# Patient Record
Sex: MALE | Race: OTHER | NOT HISPANIC OR LATINO | URBAN - METROPOLITAN AREA
[De-identification: names, ages, dates, MRNs, and addresses within clinical notes are randomized per-mention and may not be internally consistent; named-entity substitution may affect disease eponyms.]

---

## 2019-12-05 ENCOUNTER — INPATIENT (INPATIENT)
Facility: HOSPITAL | Age: 54
LOS: 3 days | Discharge: HOME | DRG: 199 | End: 2019-12-09
Attending: INTERNAL MEDICINE | Admitting: INTERNAL MEDICINE
Payer: MEDICAID

## 2019-12-05 VITALS
TEMPERATURE: 98 F | WEIGHT: 160.06 LBS | HEIGHT: 65 IN | OXYGEN SATURATION: 96 % | DIASTOLIC BLOOD PRESSURE: 97 MMHG | HEART RATE: 116 BPM | SYSTOLIC BLOOD PRESSURE: 217 MMHG | RESPIRATION RATE: 18 BRPM

## 2019-12-05 DIAGNOSIS — I16.1 HYPERTENSIVE EMERGENCY: ICD-10-CM

## 2019-12-05 DIAGNOSIS — R55 SYNCOPE AND COLLAPSE: ICD-10-CM

## 2019-12-05 LAB
BASOPHILS # BLD AUTO: 0.06 K/UL — SIGNIFICANT CHANGE UP (ref 0–0.2)
BASOPHILS NFR BLD AUTO: 0.5 % — SIGNIFICANT CHANGE UP (ref 0–2)
EOSINOPHIL # BLD AUTO: 0.07 K/UL — SIGNIFICANT CHANGE UP (ref 0–0.5)
EOSINOPHIL NFR BLD AUTO: 0.6 % — SIGNIFICANT CHANGE UP (ref 0–6)
HCT VFR BLD CALC: 47.6 % — SIGNIFICANT CHANGE UP (ref 39–50)
HGB BLD-MCNC: 15.9 G/DL — SIGNIFICANT CHANGE UP (ref 13–17)
IMM GRANULOCYTES NFR BLD AUTO: 0.6 % — SIGNIFICANT CHANGE UP (ref 0–1.5)
LYMPHOCYTES # BLD AUTO: 25.9 % — SIGNIFICANT CHANGE UP (ref 13–44)
LYMPHOCYTES # BLD AUTO: 3.13 K/UL — SIGNIFICANT CHANGE UP (ref 1–3.3)
MCHC RBC-ENTMCNC: 30.3 PG — SIGNIFICANT CHANGE UP (ref 27–34)
MCHC RBC-ENTMCNC: 33.4 GM/DL — SIGNIFICANT CHANGE UP (ref 32–36)
MCV RBC AUTO: 90.8 FL — SIGNIFICANT CHANGE UP (ref 80–100)
MONOCYTES # BLD AUTO: 0.82 K/UL — SIGNIFICANT CHANGE UP (ref 0–0.9)
MONOCYTES NFR BLD AUTO: 6.8 % — SIGNIFICANT CHANGE UP (ref 2–14)
NEUTROPHILS # BLD AUTO: 7.94 K/UL — HIGH (ref 1.8–7.4)
NEUTROPHILS NFR BLD AUTO: 65.6 % — SIGNIFICANT CHANGE UP (ref 43–77)
PLATELET # BLD AUTO: 202 K/UL — SIGNIFICANT CHANGE UP (ref 150–400)
RBC # BLD: 5.24 M/UL — SIGNIFICANT CHANGE UP (ref 4.2–5.8)
RBC # FLD: 12.3 % — SIGNIFICANT CHANGE UP (ref 10.3–14.5)
TROPONIN I SERPL-MCNC: 0.05 NG/ML — HIGH (ref 0.01–0.04)
TROPONIN I SERPL-MCNC: 0.08 NG/ML — HIGH (ref 0.01–0.04)
WBC # BLD: 12.09 K/UL — HIGH (ref 3.8–10.5)
WBC # FLD AUTO: 12.09 K/UL — HIGH (ref 3.8–10.5)

## 2019-12-05 PROCEDURE — 95816 EEG AWAKE AND DROWSY: CPT

## 2019-12-05 PROCEDURE — 93005 ELECTROCARDIOGRAM TRACING: CPT

## 2019-12-05 PROCEDURE — 84244 ASSAY OF RENIN: CPT

## 2019-12-05 PROCEDURE — 93010 ELECTROCARDIOGRAM REPORT: CPT

## 2019-12-05 PROCEDURE — 80053 COMPREHEN METABOLIC PANEL: CPT

## 2019-12-05 PROCEDURE — 93306 TTE W/DOPPLER COMPLETE: CPT

## 2019-12-05 PROCEDURE — 36415 COLL VENOUS BLD VENIPUNCTURE: CPT

## 2019-12-05 PROCEDURE — 70551 MRI BRAIN STEM W/O DYE: CPT

## 2019-12-05 PROCEDURE — 82088 ASSAY OF ALDOSTERONE: CPT

## 2019-12-05 PROCEDURE — 80061 LIPID PANEL: CPT

## 2019-12-05 PROCEDURE — A9500: CPT

## 2019-12-05 PROCEDURE — 70450 CT HEAD/BRAIN W/O DYE: CPT | Mod: 26

## 2019-12-05 PROCEDURE — 99222 1ST HOSP IP/OBS MODERATE 55: CPT

## 2019-12-05 PROCEDURE — 85027 COMPLETE CBC AUTOMATED: CPT

## 2019-12-05 PROCEDURE — 71045 X-RAY EXAM CHEST 1 VIEW: CPT | Mod: 26

## 2019-12-05 PROCEDURE — 93017 CV STRESS TEST TRACING ONLY: CPT

## 2019-12-05 PROCEDURE — 78452 HT MUSCLE IMAGE SPECT MULT: CPT

## 2019-12-05 PROCEDURE — 84484 ASSAY OF TROPONIN QUANT: CPT

## 2019-12-05 PROCEDURE — 93975 VASCULAR STUDY: CPT

## 2019-12-05 RX ORDER — ONDANSETRON 8 MG/1
4 TABLET, FILM COATED ORAL EVERY 6 HOURS
Refills: 0 | Status: DISCONTINUED | OUTPATIENT
Start: 2019-12-05 | End: 2019-12-09

## 2019-12-05 RX ORDER — LABETALOL HCL 100 MG
10 TABLET ORAL ONCE
Refills: 0 | Status: COMPLETED | OUTPATIENT
Start: 2019-12-05 | End: 2019-12-05

## 2019-12-05 RX ORDER — POTASSIUM CHLORIDE 20 MEQ
40 PACKET (EA) ORAL ONCE
Refills: 0 | Status: COMPLETED | OUTPATIENT
Start: 2019-12-05 | End: 2019-12-05

## 2019-12-05 RX ORDER — HYDRALAZINE HCL 50 MG
10 TABLET ORAL EVERY 4 HOURS
Refills: 0 | Status: DISCONTINUED | OUTPATIENT
Start: 2019-12-05 | End: 2019-12-09

## 2019-12-05 RX ORDER — LISINOPRIL 2.5 MG/1
10 TABLET ORAL DAILY
Refills: 0 | Status: DISCONTINUED | OUTPATIENT
Start: 2019-12-05 | End: 2019-12-06

## 2019-12-05 RX ORDER — LABETALOL HCL 100 MG
200 TABLET ORAL
Refills: 0 | Status: DISCONTINUED | OUTPATIENT
Start: 2019-12-05 | End: 2019-12-09

## 2019-12-05 RX ADMIN — LISINOPRIL 10 MILLIGRAM(S): 2.5 TABLET ORAL at 14:54

## 2019-12-05 RX ADMIN — Medication 10 MILLIGRAM(S): at 20:05

## 2019-12-05 RX ADMIN — Medication 10 MILLIGRAM(S): at 12:59

## 2019-12-05 RX ADMIN — Medication 40 MILLIEQUIVALENT(S): at 13:35

## 2019-12-05 RX ADMIN — Medication 200 MILLIGRAM(S): at 15:30

## 2019-12-05 RX ADMIN — Medication 10 MILLIGRAM(S): at 13:33

## 2019-12-05 NOTE — ED PROVIDER NOTE - OBJECTIVE STATEMENT
55 y/o male presents to the ED BIBEMS c/o witnessed seizure at work. Brother states he found pt on the ground, unconscious, and skin was tinted purple. Pt works as a . Brother states pt was on the ground for approx 5 minutes then was able to get up and sit down in a chair with assistance. Denies tongue biting, urinary or bowel incontinence. had blurry vision before episode. Pt has no complaints at this time. Presents with elevated BP, and systolic was 244 on ambulance PTA. No known PMHx, due to never seeing a primary in the past. Non smoker. States he consumes a "6 pack of beer" on the weekends, socially. Denies daily EtOH consumption. No drug use. 55 y/o male with no known PMHx, due to never seeing a primary in the past.presents to the ED BIBEMS c/o witnessed seizure at work. Brother states he found pt on the ground, unconscious, and skin was tinted purple. Pt works as a . Brother states pt had shaking and a post ictal period of 5 minutes then was able to get up and sit down in a chair with assistance. Denies tongue biting, urinary or bowel incontinence. Pt states he had blurry vision before episode. Denies hx of similar episode or seizures. Pt has no complaints at this time. Presents with elevated BP, and systolic was 244 on ambulance PTA. Non smoker. States he consumes a "6 pack of beer" on the weekends, socially. Denies daily EtOH consumption. No drug use.

## 2019-12-05 NOTE — PATIENT PROFILE ADULT - NSPROSPHOSPCHAPLAINYN_GEN_A_NUR
Dougie called stated that she is going to have her labs on done Monday but doesn't know if he need to start that anastrozole (ARIMIDEX) 1 MG Tab ASAP please  call her thank you    no

## 2019-12-05 NOTE — ED PROVIDER NOTE - ENMT, MLM
Airway patent, Nasal mucosa clear. Mouth with normal mucosa. Throat has no vesicles, no oropharyngeal exudates and uvula is midline. No evidence of tongue biting.

## 2019-12-05 NOTE — ED ADULT NURSE NOTE - OBJECTIVE STATEMENT
pt BIBA for new onset witnessed seizure while at work , pt is a   . Pt states he eyes were blurry then he had witnessed seizure m neg head injury

## 2019-12-05 NOTE — ED ADULT NURSE NOTE - INTERPRETATION SERVICES DECLINED
Patient/Caregiver requests family/friend to interpret. Patient/Caregiver requests family/friend to interpret./  [phone]

## 2019-12-05 NOTE — CONSULT NOTE ADULT - PROBLEM SELECTOR RECOMMENDATION 9
cont. labetalol 200 mg po BID & lisinopril 20 mg daily.  Add hydralazine 10 mg IV q6 hrs if SBPs remain uncontrolled.  Check renal ultrasound for secondary causes of hypertension.  Await echocardiogram & serial trops (currently borderline elevated consistent w/ hypertensive crisis). cont. labetalol 200 mg po BID & lisinopril 20 mg daily.  Add hydralazine 10 mg IV q6 hrs if SBPs remain uncontrolled.  Check renal ultrasound for secondary causes of hypertension. check aldosterone level. Await echocardiogram & serial trops (currently borderline elevated consistent w/ hypertensive crisis). cont. labetalol 200 mg po BID & lisinopril 20 mg daily.  Add hydralazine 10 mg IV q6 hrs if SBPs remain uncontrolled.  Check renal ultrasound for secondary causes of hypertension. check aldosterone level and renin activity also for secondary causes. Await echocardiogram & serial trops (currently borderline elevated consistent w/ hypertensive crisis).

## 2019-12-05 NOTE — ED PROVIDER NOTE - CLINICAL SUMMARY MEDICAL DECISION MAKING FREE TEXT BOX
55 y/o male presents with seizure vs syncope, found to be hypertensive on arrival, EKG, CT head, labs.

## 2019-12-05 NOTE — CONSULT NOTE ADULT - SUBJECTIVE AND OBJECTIVE BOX
53 yo male h/o herniated disc presented to the ED with a witnessed seizure while at work. Patient states he had double vision and then he lost consciousness. Per his brother patient was on the ground shaking and woke up after 5 minutes; he remained confused for some time after that.  In the ambulance he was found to have SBP >200.  ER course: given Labetalol 10mg IV x 2; CT brain negative      MEDICATIONS:  MEDICATIONS  (STANDING):  labetalol 200 milliGRAM(s) Oral two times a day  lisinopril 10 milliGRAM(s) Oral daily    MEDICATIONS  (PRN):  aluminum hydroxide/magnesium hydroxide/simethicone Suspension 30 milliLiter(s) Oral every 4 hours PRN Dyspepsia  ondansetron Injectable 4 milliGRAM(s) IV Push every 6 hours PRN Nausea      REVIEW OF SYSTEMS:    CONSTITUTIONAL: No weakness, fevers or chills  EYES/ENT: No visual changes;  No vertigo or throat pain   NECK: No pain or stiffness  RESPIRATORY: No cough, wheezing, hemoptysis; No shortness of breath  CARDIOVASCULAR: No chest pain or palpitations  GASTROINTESTINAL: No abdominal or epigastric pain. No nausea, vomiting, or hematemesis; No diarrhea or constipation. No melena or hematochezia.  GENITOURINARY: No dysuria, frequency or hematuria  NEUROLOGICAL: No numbness or weakness  SKIN: No itching, burning, rashes, or lesions   All other review of systems is negative unless indicated above    Vital Signs Last 24 Hrs  T(C): 37.1 (05 Dec 2019 16:16), Max: 37.1 (05 Dec 2019 15:17)  T(F): 98.7 (05 Dec 2019 16:16), Max: 98.7 (05 Dec 2019 15:17)  HR: 91 (05 Dec 2019 17:31) (82 - 116)  BP: 191/95 (05 Dec 2019 17:31) (178/101 - 217/97)  BP(mean): 120 (05 Dec 2019 15:17) (120 - 120)  RR: 17 (05 Dec 2019 16:16) (16 - 20)  SpO2: 98% (05 Dec 2019 16:16) (96% - 99%)    I&O's Summary      PHYSICAL EXAM:    Constitutional: NAD, awake and alert, well-developed  HEENT: PERR, EOMI,  No oral cyananosis.  Neck:  supple,  No JVD  Respiratory: Breath sounds are clear bilaterally, No wheezing, rales or rhonchi  Cardiovascular: S1 and S2, regular rate and rhythm, no Murmurs, gallops or rubs  Gastrointestinal: Bowel Sounds present, soft, nontender.   Extremities: No peripheral edema. No clubbing or cyanosis.  Vascular: 2+ peripheral pulses  Neurological: A/O x 3, no focal deficits  Musculoskeletal: no calf tenderness.  Skin: No rashes.      LABS: All Labs Reviewed:                        15.9   12.09 )-----------( 202      ( 05 Dec 2019 11:39 )             47.6     05 Dec 2019 12:29    133    |  99     |  17     ----------------------------<  134    3.1     |  27     |  1.09     Ca    8.2        05 Dec 2019 12:29  Mg     2.2       05 Dec 2019 12:29    TPro  8.1    /  Alb  4.0    /  TBili  0.8    /  DBili  x      /  AST  54     /  ALT  93     /  AlkPhos  127    05 Dec 2019 12:29    PT/INR - ( 05 Dec 2019 12:29 )   PT: 11.9 sec;   INR: 1.07 ratio         PTT - ( 05 Dec 2019 12:29 )  PTT:25.2 sec  CARDIAC MARKERS ( 05 Dec 2019 14:29 )  0.054 ng/mL / x     / x     / x     / x      CARDIAC MARKERS ( 05 Dec 2019 12:29 )  <0.015 ng/mL / x     / x     / x     / x          Blood Culture:     RADIOLOGY/EKG:    ECG - NSR, RBBB, occasional PVCs    CXR - no acute process

## 2019-12-05 NOTE — H&P ADULT - HISTORY OF PRESENT ILLNESS
53 yo male h/o herniated disc presented to the ED with a witnessed seizure while at work. Patient states he had double vision and then he lost consciousness. Per his brother patient was on the ground shaking and woke up after 5 minutes; he remained confused for some time after that.  In the ambulance he was found to have SBP >200.  ER course: given Labetalol 10mg IV x 2; CT brain negative    PMHx: as above  PSHx: none  Social History: no smoking, drinks beers on the weekend but no daily use  Family History: brothers in good health; father in good health  no h/o CAD/MI/DM            REVIEW OF SYSTEMS:    CONSTITUTIONAL: No weakness, No fevers or chills  ENT: No ear ache, No sorethroat  NECK: No pain, No stiffness  RESPIRATORY: No cough, No wheezing, No hemoptysis; No dyspnea  CARDIOVASCULAR: No chest pain, No palpitations  GASTROINTESTINAL: No abd pain, No nausea, No vomiting, No hematemesis, No diarrhea or constipation. No melena, No hematochezia.  GENITOURINARY: No dysuria, No  hematuria  NEUROLOGICAL: No diplopia, No paresthesia, No motor dysfunction  MUSCULOSKELETAL: No arthralgia, No myalgia  SKIN: No rashes, or lesions   PSYCH: no anxiety, no suicidal ideation    All other review of systems is negative unless indicated above    Vital Signs Last 24 Hrs  T(C): 36.9 (05 Dec 2019 12:24), Max: 36.9 (05 Dec 2019 12:24)  T(F): 98.4 (05 Dec 2019 12:24), Max: 98.4 (05 Dec 2019 12:24)  HR: 82 (05 Dec 2019 14:07) (82 - 116)  BP: 186/110 (05 Dec 2019 14:07) (186/110 - 217/97)  BP(mean): --  RR: 16 (05 Dec 2019 12:24) (16 - 18)  SpO2: 97% (05 Dec 2019 12:24) (96% - 97%)    PHYSICAL EXAM:    GENERAL: NAD, Well nourished  HEENT:  NC/AT, EOMI, PERRLA, No scleral icterus, Moist mucous membranes  NECK: Supple, No JVD  CNS:  Alert & Oriented X3, Motor Strength 5/5 B/L upper and lower extremities; DTRs 2+ intact   LUNG: Normal Breath sounds, Clear to auscultation bilaterally, No rales, No rhonchi, No wheezing  HEART: RRR; No murmurs, No rubs  ABDOMEN: +BS, ST/ND/NT  GENITOURINARY: Voiding, Bladder not distended  EXTREMITIES:  2+ Peripheral Pulses, No clubbing, No cyanosis, No tibial edema  MUSCULOSKELTAL: Joints normal ROM, No TTP, No effusion  SKIN: no rashes  RECTAL: deferred, not indicated  BREAST: deferred                          15.9   12.09 )-----------( 202      ( 05 Dec 2019 11:39 )             47.6     12-05    133<L>  |  99  |  17  ----------------------------<  134<H>  3.1<L>   |  27  |  1.09    Ca    8.2<L>      05 Dec 2019 12:29  Mg     2.2     12-05    TPro  8.1  /  Alb  4.0  /  TBili  0.8  /  DBili  x   /  AST  54<H>  /  ALT  93<H>  /  AlkPhos  127<H>  12-05    Vancomycin levels:   Cultures:     MEDICATIONS  (STANDING):  labetalol 200 milliGRAM(s) Oral two times a day  lisinopril 10 milliGRAM(s) Oral daily    MEDICATIONS  (PRN):  aluminum hydroxide/magnesium hydroxide/simethicone Suspension 30 milliLiter(s) Oral every 4 hours PRN Dyspepsia  ondansetron Injectable 4 milliGRAM(s) IV Push every 6 hours PRN Nausea      all labs reviewed  all imaging reviewed    a/p:    1. Hypertensive emergency:  s/p Labetalol IV x 2  start Labetalol 200mg po Bid  Lisinopril 10mg daily  Admit to telemetry  Echocardiogram  f/u Trops x 3  Cardiology evaluation     2. New onset seizures:  Ct brain negative  Neuro consult

## 2019-12-05 NOTE — ED ADULT TRIAGE NOTE - CHIEF COMPLAINT QUOTE
Patient states he passed out at work. He is A&Ox4. As per patient brother they were at work when he witnessed him having a seizure. Patient didn't hit his head he was caught. Denies any history of seizure.

## 2019-12-06 DIAGNOSIS — R56.9 UNSPECIFIED CONVULSIONS: ICD-10-CM

## 2019-12-06 DIAGNOSIS — R79.89 OTHER SPECIFIED ABNORMAL FINDINGS OF BLOOD CHEMISTRY: ICD-10-CM

## 2019-12-06 LAB — ALDOST SERPL-MCNC: 5.1 NG/DL — SIGNIFICANT CHANGE UP

## 2019-12-06 PROCEDURE — 99223 1ST HOSP IP/OBS HIGH 75: CPT

## 2019-12-06 PROCEDURE — 93975 VASCULAR STUDY: CPT | Mod: 26

## 2019-12-06 PROCEDURE — 70551 MRI BRAIN STEM W/O DYE: CPT | Mod: 26

## 2019-12-06 PROCEDURE — 99232 SBSQ HOSP IP/OBS MODERATE 35: CPT

## 2019-12-06 PROCEDURE — 95816 EEG AWAKE AND DROWSY: CPT | Mod: 26

## 2019-12-06 RX ORDER — LISINOPRIL 2.5 MG/1
10 TABLET ORAL ONCE
Refills: 0 | Status: COMPLETED | OUTPATIENT
Start: 2019-12-06 | End: 2019-12-06

## 2019-12-06 RX ORDER — LISINOPRIL 2.5 MG/1
20 TABLET ORAL DAILY
Refills: 0 | Status: DISCONTINUED | OUTPATIENT
Start: 2019-12-06 | End: 2019-12-07

## 2019-12-06 RX ADMIN — LISINOPRIL 10 MILLIGRAM(S): 2.5 TABLET ORAL at 06:50

## 2019-12-06 RX ADMIN — Medication 200 MILLIGRAM(S): at 06:50

## 2019-12-06 RX ADMIN — LISINOPRIL 10 MILLIGRAM(S): 2.5 TABLET ORAL at 13:02

## 2019-12-06 RX ADMIN — Medication 200 MILLIGRAM(S): at 17:59

## 2019-12-06 NOTE — CONSULT NOTE ADULT - SUBJECTIVE AND OBJECTIVE BOX
CC: 54 y old  Male who presents with a chief complaint of     HPI:  55 yo  male with PMH of herniated discs presented to the ED BIBEMS c/o witnessed seizure at work. Patient reports he felt his vision closing on him, then he lost consciousness and does not remember other details. As per his brother who provided history, pt works as a , he was found on the ground, unconscious, with skin palor, preceded by some shaking and followed by post episodic period of 5 minutes, then was able to get up and sit down in a chair with assistance. No tongue biting, urinary or bowel incontinence was seen. Enroute in the ambulance he was found to have SBP >200, in ED, given Labetalol 10 mg IV x 2; CT brain negative.    Upon obtaining history today, he has no complaints of HA, dizziness. He reports one previous episode of passing out few years ago, does not recall if there was any shaking or if it was related to any infection / ETOH use.    PMHx:   Disc herniation    PSHx:   None    Social History: Denies smoking or drug use, drinks beers on the weekend       Family History: No h/o CAD/MI/DM oe Epilepsy      MEDICATIONS  (STANDING):  labetalol 200 milliGRAM(s) Oral two times a day  lisinopril 10 milliGRAM(s) Oral daily       Allergies  No Known Allergies    Intolerances      ROS: Pertinent positives in HPI, all other ROS were reviewed and are negative.        Vital Signs Last 24 Hrs  T(C): 36.5 (06 Dec 2019 10:55), Max: 37.1 (05 Dec 2019 15:17)  T(F): 97.7 (06 Dec 2019 10:55), Max: 98.7 (05 Dec 2019 15:17)  HR: 78 (06 Dec 2019 10:55) (77 - 116)  BP: 155/76 (06 Dec 2019 10:55) (134/70 - 217/97)  BP(mean): 120 (05 Dec 2019 15:17) (120 - 120)  RR: 18 (06 Dec 2019 10:55) (16 - 20)  SpO2: 97% (06 Dec 2019 10:55) (96% - 99%)      Gen exam:  Normocephalic, no distress.  HEENT: PERRLA, left eye pterygium,    Neck: Supple.  Respiratory: Breath sounds are clear bilaterally  Cardiovascular: S1 and S2, regular   Extremities:  no edema  Vascular: Carotid Bruit - no  Musculoskeletal: no abnormal movements  Skin: No rashes      Neurological exam:  HF: A x O x 3. inter-active, normal affect. Speech fluent, No Aphasia. Naming /repetition intact   CN: HERMILO, EOMI, VFF, facial sensation normal, no NLFD, No tongue bite, Palate moves equally, SCM equal bilaterally  Motor: No pronator drift, Strength 5/5 in all 4 ext, normal bulk and tone, no tremor, rigidity    Sens: Intact to light touch / PP    Reflexes: Symmetric and normal. downgoing toes b/l  Coord:  No FNFA, dysmetria   Gait/Balance: Not tested      Labs:   12-05    133<L>  |  99  |  17  ----------------------------<  134<H>  3.1<L>   |  27  |  1.09    Ca    8.2<L>      05 Dec 2019 12:29  Mg     2.2     12-05    TPro  8.1  /  Alb  4.0  /  TBili  0.8  /  DBili  x   /  AST  54<H>  /  ALT  93<H>  /  AlkPhos  127<H>  12-05                        15.9   12.09 )-----------( 202      ( 05 Dec 2019 11:39 )             47.6       Radiology:  - CT Head: < from: CT Head No Cont (12.05.19 @ 12:08) >  Impression: No CT evidence of acute intracranial abnormality. MRI should   be considered for further evaluation if this is a first time seizure.

## 2019-12-06 NOTE — CONSULT NOTE ADULT - ASSESSMENT
55 yo  male with PMH of herniated discs presented to the ED [facility] c/o witnessed seizure at work, reports he felt his vision closing on him, then lost consciousness, per his brother pt works as a , he was found on the ground, unconscious, with pale skin, preceded by shaking and followed by post episodic period of 5 minutes. No tongue biting, urinary-bowel incontinence was seen. Enroute and in ED, SBP >200, given Labetalol 10 mg IV x 2; CT brain negative.    # New Onset seizure, vs convulsive seizure    - obtain EEG today  - MRI brain rule out CNS lesion / mass  - No AED indicated at present    # Hypertensive emergency with possible Encephalopathy - resolved    - Management of BP per cardio/IM    Dr. Roque will follow-up with results on 12/7/19

## 2019-12-06 NOTE — PROGRESS NOTE ADULT - SUBJECTIVE AND OBJECTIVE BOX
Patient is a 54y old  Male who presents with a chief complaint of hypertensive emergency (06 Dec 2019 12:31)      HPI:  53 yo male h/o herniated disc presented to the ED with a witnessed seizure while at work. Patient states he had double vision and then he lost consciousness. Per his brother patient was on the ground shaking and woke up after 5 minutes; he remained confused for some time after that.  In the ambulance he was found to have SBP >200.  ER course: given Labetalol 10mg IV x 2; CT brain negative    REVIEW OF SYSTEMS:  CONSTITUTIONAL: No weakness, fevers or chills  EYES/ENT: No visual changes;  No vertigo or throat pain   NECK: No pain or stiffness  RESPIRATORY: No cough, wheezing, hemoptysis; No shortness of breath  CARDIOVASCULAR: No chest pain or palpitations  GASTROINTESTINAL: No abdominal or epigastric pain. No nausea, vomiting, or hematemesis; No diarrhea or constipation. No melena or hematochezia.  GENITOURINARY: No dysuria, frequency or hematuria  NEUROLOGICAL: No numbness or weakness  SKIN: No itching, rashes      T(C): 36.5 (12-06-19 @ 10:55), Max: 37.1 (12-05-19 @ 15:17)  HR: 78 (12-06-19 @ 10:55) (77 - 94)  BP: 155/76 (12-06-19 @ 10:55) (134/70 - 205/112)  RR: 18 (12-06-19 @ 10:55) (17 - 20)  SpO2: 97% (12-06-19 @ 10:55) (97% - 99%)  Wt(kg): --    PHYSICAL EXAM:    GENERAL: NAD, well-groomed, well-developed  HEAD:  Atraumatic, Normocephalic  EYES: EOMI, PERRLA, conjunctiva and sclera clear  ENMT: Moist mucous membranes  NECK: Supple, No JVD  NERVOUS SYSTEM:  Alert & Oriented X3, Motor Strength 5/5 B/L upper and lower extremities;   CHEST/LUNG: Clear to auscultation bilaterally; No rales, rhonchi, wheezing, or rubs  HEART: Regular rate and rhythm; No murmurs, rubs, or gallops  ABDOMEN: Soft, Nontender, Nondistended; Bowel sounds present  GENITOURINARY- Voiding, no palpable bladder  EXTREMITIES:  No clubbing, cyanosis, or edema  MUSCULOSKELTAL- No muscle tenderness, Muscle tone normal, No joint tenderness, no Joint swelling, Joint range of motion-normal  SKIN-no rash, no lesion  CNS- alert, oriented X3, non focal       LABS:                        15.9   12.09 )-----------( 202      ( 05 Dec 2019 11:39 )             47.6     12-05    133<L>  |  99  |  17  ----------------------------<  134<H>  3.1<L>   |  27  |  1.09    Ca    8.2<L>      05 Dec 2019 12:29  Mg     2.2     12-05    TPro  8.1  /  Alb  4.0  /  TBili  0.8  /  DBili  x   /  AST  54<H>  /  ALT  93<H>  /  AlkPhos  127<H>  12-05    PT/INR - ( 05 Dec 2019 12:29 )   PT: 11.9 sec;   INR: 1.07 ratio         PTT - ( 05 Dec 2019 12:29 )  PTT:25.2 sec        CARDIAC MARKERS ( 05 Dec 2019 21:47 )  0.080 ng/mL / x     / x     / x     / x      CARDIAC MARKERS ( 05 Dec 2019 14:29 )  0.054 ng/mL / x     / x     / x     / x      CARDIAC MARKERS ( 05 Dec 2019 12:29 )  <0.015 ng/mL / x     / x     / x     / x          RADIOLOGY & ADDITIONAL TESTS:    < from: MR Head No Cont (12.06.19 @ 12:49) >  IMPRESSION:   Multiple foci of T2/FLAIR hyperintense signal within the   periventricular white matter, nonspecific in a patient of this age, may   reflect early onset chronic microvascular ischemic changes versus other   etiology.     < from: US Abdomen Doppler (12.06.19 @ 08:57) >  IMPRESSION:   No sonographic evidence of renal artery stenosis.    < from: Xray Chest 1 View- PORTABLE-Urgent (12.05.19 @ 12:16) >  Impression:  Clear lungs.    < from: CT Head No Cont (12.05.19 @ 12:08) >  Impression: No CT evidence of acute intracranial abnormality. MRI should   be considered for further evaluation if this is a first time seizure.      MEDICATIONS  Daily     aluminum hydroxide/magnesium hydroxide/simethicone Suspension 30 milliLiter(s) Oral every 4 hours PRN  hydrALAZINE Injectable 10 milliGRAM(s) IV Push every 4 hours PRN  labetalol 200 milliGRAM(s) Oral two times a day  lisinopril 20 milliGRAM(s) Oral daily  ondansetron Injectable 4 milliGRAM(s) IV Push every 6 hours PRN Patient is a 54y old  Male who presents with a chief complaint of hypertensive emergency (06 Dec 2019 12:31)      HPI:  55 yo male h/o herniated disc presented to the ED with a witnessed seizure while at work. Patient states he had double vision and then he lost consciousness. Per his brother patient was on the ground shaking and woke up after 5 minutes; he remained confused for some time after that.  In the ambulance he was found to have SBP >200.  ER course: given Labetalol 10mg IV x 2; CT brain negative    12/06/2019: Patient seen and examined at bed side. No reportable events over night. Patient reports no pain or headaches. Denies abdomen tenderness, calf tenderness. Patient has limited English comprehension.  needed.    REVIEW OF SYSTEMS:  CONSTITUTIONAL: No weakness, fevers or chills  EYES/ENT: No visual changes;  No vertigo or throat pain   NECK: No pain or stiffness  RESPIRATORY: No cough, wheezing, hemoptysis; No shortness of breath  CARDIOVASCULAR: No chest pain or palpitations  GASTROINTESTINAL: No abdominal or epigastric pain. No nausea, vomiting, or hematemesis; No diarrhea or constipation. No melena or hematochezia.  GENITOURINARY: No dysuria, frequency or hematuria  NEUROLOGICAL: No numbness or weakness  SKIN: No itching, rashes      T(C): 36.5 (12-06-19 @ 10:55), Max: 37.1 (12-05-19 @ 15:17)  HR: 78 (12-06-19 @ 10:55) (77 - 94)  BP: 155/76 (12-06-19 @ 10:55) (134/70 - 205/112)  RR: 18 (12-06-19 @ 10:55) (17 - 20)  SpO2: 97% (12-06-19 @ 10:55) (97% - 99%)  Wt(kg): --    PHYSICAL EXAM:    GENERAL: NAD, well-groomed, well-developed  HEAD:  Atraumatic, Normocephalic  EYES: EOMI, PERRLA, conjunctiva and sclera clear  ENMT: Moist mucous membranes  NECK: Supple, No JVD  NERVOUS SYSTEM:  Alert & Oriented X3, Motor Strength 5/5 B/L upper and lower extremities;   CHEST/LUNG: Clear to auscultation bilaterally; No rales, rhonchi, wheezing, or rubs  HEART: Regular rate and rhythm; No murmurs, rubs, or gallops  ABDOMEN: Soft, Nontender, Nondistended; Bowel sounds present  GENITOURINARY- Voiding, no palpable bladder  EXTREMITIES:  No clubbing, cyanosis, or edema  MUSCULOSKELTAL- No muscle tenderness, Muscle tone normal, No joint tenderness, no Joint swelling, Joint range of motion-normal  SKIN-no rash, no lesion  CNS- alert, oriented X3, non focal       LABS:                        15.9   12.09 )-----------( 202      ( 05 Dec 2019 11:39 )             47.6     12-05    133<L>  |  99  |  17  ----------------------------<  134<H>  3.1<L>   |  27  |  1.09    Ca    8.2<L>      05 Dec 2019 12:29  Mg     2.2     12-05    TPro  8.1  /  Alb  4.0  /  TBili  0.8  /  DBili  x   /  AST  54<H>  /  ALT  93<H>  /  AlkPhos  127<H>  12-05    PT/INR - ( 05 Dec 2019 12:29 )   PT: 11.9 sec;   INR: 1.07 ratio         PTT - ( 05 Dec 2019 12:29 )  PTT:25.2 sec        CARDIAC MARKERS ( 05 Dec 2019 21:47 )  0.080 ng/mL / x     / x     / x     / x      CARDIAC MARKERS ( 05 Dec 2019 14:29 )  0.054 ng/mL / x     / x     / x     / x      CARDIAC MARKERS ( 05 Dec 2019 12:29 )  <0.015 ng/mL / x     / x     / x     / x          RADIOLOGY & ADDITIONAL TESTS:    < from: MR Head No Cont (12.06.19 @ 12:49) >  IMPRESSION:   Multiple foci of T2/FLAIR hyperintense signal within the   periventricular white matter, nonspecific in a patient of this age, may   reflect early onset chronic microvascular ischemic changes versus other   etiology.     < from: US Abdomen Doppler (12.06.19 @ 08:57) >  IMPRESSION:   No sonographic evidence of renal artery stenosis.    < from: Xray Chest 1 View- PORTABLE-Urgent (12.05.19 @ 12:16) >  Impression:  Clear lungs.    < from: CT Head No Cont (12.05.19 @ 12:08) >  Impression: No CT evidence of acute intracranial abnormality. MRI should   be considered for further evaluation if this is a first time seizure.      MEDICATIONS  Daily     aluminum hydroxide/magnesium hydroxide/simethicone Suspension 30 milliLiter(s) Oral every 4 hours PRN  hydrALAZINE Injectable 10 milliGRAM(s) IV Push every 4 hours PRN  labetalol 200 milliGRAM(s) Oral two times a day  lisinopril 20 milliGRAM(s) Oral daily  ondansetron Injectable 4 milliGRAM(s) IV Push every 6 hours PRN

## 2019-12-06 NOTE — PROGRESS NOTE ADULT - ASSESSMENT
a/p:    # Hypertensive emergency:  Cardiology monitoring  SBPs improved on labetalol & lisinopril  s/p Labetalol IV x 2  Continue Labetalol 200mg po Bid  Increase lisinopril 10mg-> 20 mg daily as per cardio  Renal US neg for NEGRA  Await aldosterone levels for workup of secondary causes of htn.   Telemetry- [facility]. [place]   Echocardiogram?    # New onset seizures:  Ct brain negative  MRI: possible early onset chronic microvascular ischemic changes  EEG: Awaiting results.   Neuro on board.    #Elevated Troponin   - borderline elevation likely secondary to severe hypertension  - trend troponin levels until downward trend as per Cardio a/p:    # Hypertensive emergency:  Cardiology monitoring  SBPs improved on labetalol & lisinopril  s/p Labetalol IV x 2  Continue Labetalol 200mg po Bid  Increase lisinopril 10mg-> 20 mg daily as per cardio  Renal US neg for NEGRA  Await aldosterone levels for workup of secondary causes of htn.   Telemetry- [facility]. [place]   Echocardiogram - f/u results    # New onset seizures:  Ct brain negative  MRI: possible early onset chronic microvascular ischemic changes  EEG: Awaiting results.   Neuro on board.    #Elevated Troponin   - borderline elevation likely secondary to severe hypertension  - trend troponin levels until downward trend as per Cardio

## 2019-12-06 NOTE — PROGRESS NOTE ADULT - PROBLEM SELECTOR PLAN 2
borderline elevation likely secondary to severe hypertension.  Cont. to trend trops until downward trend.

## 2019-12-06 NOTE — PROGRESS NOTE ADULT - PROBLEM SELECTOR PLAN 1
SBPs improved on labetalol & lisinopril.  Increase lisinopril to 20 mg daily. Renal US neg for NEGRA, await tio levels for workup of secondary causes of htn.

## 2019-12-06 NOTE — PROGRESS NOTE ADULT - SUBJECTIVE AND OBJECTIVE BOX
53 yo male h/o herniated disc presented to the ED with a witnessed seizure while at work. Patient states he had double vision and then he lost consciousness. Per his brother patient was on the ground shaking and woke up after 5 minutes; he remained confused for some time after that.  In the ambulance he was found to have SBP >200.  ER course: given Labetalol 10mg IV x 2; CT brain negative    12/6/19: no complaints overnight - no chest pain, dyspnea.  SBPs better controlled in 150s but hydralazine PRN given.    MEDICATIONS:    MEDICATIONS  (STANDING):  labetalol 200 milliGRAM(s) Oral two times a day  lisinopril 10 milliGRAM(s) Oral daily    MEDICATIONS  (PRN):  aluminum hydroxide/magnesium hydroxide/simethicone Suspension 30 milliLiter(s) Oral every 4 hours PRN Dyspepsia  hydrALAZINE Injectable 10 milliGRAM(s) IV Push every 4 hours PRN SBP>170  ondansetron Injectable 4 milliGRAM(s) IV Push every 6 hours PRN Nausea    Vital Signs Last 24 Hrs  T(C): 36.5 (06 Dec 2019 10:55), Max: 37.1 (05 Dec 2019 15:17)  T(F): 97.7 (06 Dec 2019 10:55), Max: 98.7 (05 Dec 2019 15:17)  HR: 78 (06 Dec 2019 10:55) (77 - 94)  BP: 155/76 (06 Dec 2019 10:55) (134/70 - 205/112)  BP(mean): 120 (05 Dec 2019 15:17) (120 - 120)  RR: 18 (06 Dec 2019 10:55) (17 - 20)  SpO2: 97% (06 Dec 2019 10:55) (97% - 99%)Daily     Daily I&O's Summary      PHYSICAL EXAM:    Constitutional: NAD, awake and alert, well-developed  HEENT: PERR, EOMI,  No oral cyananosis.  Neck:  supple,  No JVD  Respiratory: Breath sounds are clear bilaterally, No wheezing, rales or rhonchi  Cardiovascular: S1 and S2, regular rate and rhythm, no Murmurs, gallops or rubs  Gastrointestinal: Bowel Sounds present, soft, nontender.   Extremities: No peripheral edema. No clubbing or cyanosis.  Vascular: 2+ peripheral pulses  Neurological: A/O x 3, no focal deficits  Musculoskeletal: no calf tenderness.  Skin: No rashes.      LABS: All Labs Reviewed:                        15.9   12.09 )-----------( 202      ( 05 Dec 2019 11:39 )             47.6     05 Dec 2019 12:29    133    |  99     |  17     ----------------------------<  134    3.1     |  27     |  1.09     Ca    8.2        05 Dec 2019 12:29  Mg     2.2       05 Dec 2019 12:29    TPro  8.1    /  Alb  4.0    /  TBili  0.8    /  DBili  x      /  AST  54     /  ALT  93     /  AlkPhos  127    05 Dec 2019 12:29    PT/INR - ( 05 Dec 2019 12:29 )   PT: 11.9 sec;   INR: 1.07 ratio         PTT - ( 05 Dec 2019 12:29 )  PTT:25.2 sec  CARDIAC MARKERS ( 05 Dec 2019 21:47 )  0.080 ng/mL / x     / x     / x     / x      CARDIAC MARKERS ( 05 Dec 2019 14:29 )  0.054 ng/mL / x     / x     / x     / x      CARDIAC MARKERS ( 05 Dec 2019 12:29 )  <0.015 ng/mL / x     / x     / x     / x          < from: US Abdomen Doppler (12.06.19 @ 08:57) >  IMPRESSION:     No sonographic evidence of renal artery stenosis.      < end of copied text >

## 2019-12-07 LAB
ALBUMIN SERPL ELPH-MCNC: 3.7 G/DL — SIGNIFICANT CHANGE UP (ref 3.3–5)
ALP SERPL-CCNC: 100 U/L — SIGNIFICANT CHANGE UP (ref 40–120)
ALT FLD-CCNC: 102 U/L — HIGH (ref 12–78)
ANION GAP SERPL CALC-SCNC: 6 MMOL/L — SIGNIFICANT CHANGE UP (ref 5–17)
AST SERPL-CCNC: 96 U/L — HIGH (ref 15–37)
BILIRUB SERPL-MCNC: 1.5 MG/DL — HIGH (ref 0.2–1.2)
BUN SERPL-MCNC: 21 MG/DL — SIGNIFICANT CHANGE UP (ref 7–23)
CALCIUM SERPL-MCNC: 8.8 MG/DL — SIGNIFICANT CHANGE UP (ref 8.5–10.1)
CHLORIDE SERPL-SCNC: 106 MMOL/L — SIGNIFICANT CHANGE UP (ref 96–108)
CHOLEST SERPL-MCNC: 179 MG/DL — SIGNIFICANT CHANGE UP (ref 10–199)
CO2 SERPL-SCNC: 25 MMOL/L — SIGNIFICANT CHANGE UP (ref 22–31)
CREAT SERPL-MCNC: 1.01 MG/DL — SIGNIFICANT CHANGE UP (ref 0.5–1.3)
GLUCOSE SERPL-MCNC: 100 MG/DL — HIGH (ref 70–99)
HCT VFR BLD CALC: 46.6 % — SIGNIFICANT CHANGE UP (ref 39–50)
HDLC SERPL-MCNC: 44 MG/DL — SIGNIFICANT CHANGE UP
HGB BLD-MCNC: 15.7 G/DL — SIGNIFICANT CHANGE UP (ref 13–17)
LIPID PNL WITH DIRECT LDL SERPL: 118 MG/DL — HIGH
MCHC RBC-ENTMCNC: 30.6 PG — SIGNIFICANT CHANGE UP (ref 27–34)
MCHC RBC-ENTMCNC: 33.7 GM/DL — SIGNIFICANT CHANGE UP (ref 32–36)
MCV RBC AUTO: 90.8 FL — SIGNIFICANT CHANGE UP (ref 80–100)
PLATELET # BLD AUTO: 187 K/UL — SIGNIFICANT CHANGE UP (ref 150–400)
POTASSIUM SERPL-MCNC: 3.8 MMOL/L — SIGNIFICANT CHANGE UP (ref 3.5–5.3)
POTASSIUM SERPL-SCNC: 3.8 MMOL/L — SIGNIFICANT CHANGE UP (ref 3.5–5.3)
PROT SERPL-MCNC: 7.9 GM/DL — SIGNIFICANT CHANGE UP (ref 6–8.3)
RBC # BLD: 5.13 M/UL — SIGNIFICANT CHANGE UP (ref 4.2–5.8)
RBC # FLD: 12.5 % — SIGNIFICANT CHANGE UP (ref 10.3–14.5)
SODIUM SERPL-SCNC: 137 MMOL/L — SIGNIFICANT CHANGE UP (ref 135–145)
TOTAL CHOLESTEROL/HDL RATIO MEASUREMENT: 4.1 RATIO — SIGNIFICANT CHANGE UP (ref 3.4–9.6)
TRIGL SERPL-MCNC: 83 MG/DL — SIGNIFICANT CHANGE UP (ref 10–149)
TROPONIN I SERPL-MCNC: <0.015 NG/ML — SIGNIFICANT CHANGE UP (ref 0.01–0.04)
WBC # BLD: 9.2 K/UL — SIGNIFICANT CHANGE UP (ref 3.8–10.5)
WBC # FLD AUTO: 9.2 K/UL — SIGNIFICANT CHANGE UP (ref 3.8–10.5)

## 2019-12-07 PROCEDURE — 93306 TTE W/DOPPLER COMPLETE: CPT | Mod: 26

## 2019-12-07 PROCEDURE — 99233 SBSQ HOSP IP/OBS HIGH 50: CPT

## 2019-12-07 PROCEDURE — 93010 ELECTROCARDIOGRAM REPORT: CPT

## 2019-12-07 PROCEDURE — 99232 SBSQ HOSP IP/OBS MODERATE 35: CPT

## 2019-12-07 RX ORDER — LISINOPRIL 2.5 MG/1
30 TABLET ORAL DAILY
Refills: 0 | Status: DISCONTINUED | OUTPATIENT
Start: 2019-12-08 | End: 2019-12-08

## 2019-12-07 RX ORDER — LISINOPRIL 2.5 MG/1
10 TABLET ORAL ONCE
Refills: 0 | Status: COMPLETED | OUTPATIENT
Start: 2019-12-07 | End: 2019-12-07

## 2019-12-07 RX ADMIN — LISINOPRIL 20 MILLIGRAM(S): 2.5 TABLET ORAL at 05:35

## 2019-12-07 RX ADMIN — Medication 200 MILLIGRAM(S): at 05:35

## 2019-12-07 RX ADMIN — LISINOPRIL 10 MILLIGRAM(S): 2.5 TABLET ORAL at 12:39

## 2019-12-07 RX ADMIN — Medication 200 MILLIGRAM(S): at 19:29

## 2019-12-07 NOTE — PROGRESS NOTE ADULT - SUBJECTIVE AND OBJECTIVE BOX
Patient is a 54y old  Male who presents with a chief complaint of hypertensive emergency (06 Dec 2019 12:31)      HPI:  53 yo male h/o herniated disc presented to the ED with a witnessed seizure while at work. Patient states he had double vision and then he lost consciousness. Per his brother patient was on the ground shaking and woke up after 5 minutes; he remained confused for some time after that.  In the ambulance he was found to have SBP >200.  ER course: given Labetalol 10mg IV x 2; CT brain negative    12/06/2019: Patient seen and examined at bed side. No reportable events over night. Patient reports no pain or headaches. Denies abdomen tenderness, calf tenderness. Patient has limited English comprehension.  needed.  12/7 -  used. pt aware his BP remains elevated and maybe cause of sz and needs further BP adjustment.  ROS:   All 10 systems reviewed and found to be negative with the exception of what has been described above.    Vital Signs Last 24 Hrs  T(C): 36.7 (07 Dec 2019 11:02), Max: 36.7 (06 Dec 2019 16:01)  T(F): 98.1 (07 Dec 2019 11:02), Max: 98.1 (07 Dec 2019 11:02)  HR: 72 (07 Dec 2019 11:02) (72 - 75)  BP: 174/84 (07 Dec 2019 11:02) (144/79 - 174/84)  BP(mean): --  RR: 18 (07 Dec 2019 11:02) (18 - 18)  SpO2: 97% (07 Dec 2019 11:02) (94% - 99%)    PHYSICAL EXAM:      GEN: lying in bed, NAD  HEENT:   NC/AT, pupils equal and reactive, EOMI  CV:  +S1, +S2, RRR  RESP:   lungs clear to auscultation bilaterally, no wheeze, rales, rhonchi   BREAST:  not examined  GI:  abdomen soft, non-tender, non-distended, normoactive BS  RECTAL:  not examined  :  not examined  MSK:   normal muscle tone  EXT:  no edema  NEURO:  AAOX3, no focal neurological deficits  SKIN:  no rashes    LABS:                                         15.7   9.20  )-----------( 187      ( 07 Dec 2019 11:36 )             46.6     12-07    137  |  106  |  21  ----------------------------<  100<H>  3.8   |  25  |  1.01    Ca    8.8      07 Dec 2019 11:36    TPro  7.9  /  Alb  3.7  /  TBili  1.5<H>  /  DBili  x   /  AST  96<H>  /  ALT  102<H>  /  AlkPhos  100  12-07    CARDIAC MARKERS ( 07 Dec 2019 11:36 )  <0.015 ng/mL / x     / x     / x     / x      CARDIAC MARKERS ( 05 Dec 2019 21:47 )  0.080 ng/mL / x     / x     / x     / x      CARDIAC MARKERS ( 05 Dec 2019 14:29 )  0.054 ng/mL / x     / x     / x     / x          LIVER FUNCTIONS - ( 07 Dec 2019 11:36 )  Alb: 3.7 g/dL / Pro: 7.9 gm/dL / ALK PHOS: 100 U/L / ALT: 102 U/L / AST: 96 U/L / GGT: x             RADIOLOGY & ADDITIONAL TESTS:    < from: MR Head No Cont (12.06.19 @ 12:49) >  IMPRESSION:   Multiple foci of T2/FLAIR hyperintense signal within the   periventricular white matter, nonspecific in a patient of this age, may   reflect early onset chronic microvascular ischemic changes versus other   etiology.     < from: US Abdomen Doppler (12.06.19 @ 08:57) >  IMPRESSION:   No sonographic evidence of renal artery stenosis.    < from: Xray Chest 1 View- PORTABLE-Urgent (12.05.19 @ 12:16) >  Impression:  Clear lungs.    < from: CT Head No Cont (12.05.19 @ 12:08) >  Impression: No CT evidence of acute intracranial abnormality. MRI should   be considered for further evaluation if this is a first time seizure.      MEDICATIONS  Daily     aluminum hydroxide/magnesium hydroxide/simethicone Suspension 30 milliLiter(s) Oral every 4 hours PRN  hydrALAZINE Injectable 10 milliGRAM(s) IV Push every 4 hours PRN  labetalol 200 milliGRAM(s) Oral two times a day  lisinopril 20 milliGRAM(s) Oral daily  ondansetron Injectable 4 milliGRAM(s) IV Push every 6 hours PRN

## 2019-12-07 NOTE — PROGRESS NOTE ADULT - SUBJECTIVE AND OBJECTIVE BOX
55 yo male h/o herniated disc presented to the ED with a witnessed seizure while at work. Patient states he had double vision and then he lost consciousness. Per his brother patient was on the ground shaking and woke up after 5 minutes; he remained confused for some time after that.  In the ambulance he was found to have SBP >200.  ER course: given Labetalol 10mg IV x 2; CT brain negative    12/6/19: no complaints overnight - no chest pain, dyspnea.  SBPs better controlled in 150s but hydralazine PRN given.  12/7/19 Patient is feeling fine , blood pressure is mild elevated ,     MEDICATIONS:    MEDICATIONS  (STANDING):  labetalol 200 milliGRAM(s) Oral two times a day  lisinopril 10 milliGRAM(s) Oral daily    MEDICATIONS  (PRN):  aluminum hydroxide/magnesium hydroxide/simethicone Suspension 30 milliLiter(s) Oral every 4 hours PRN Dyspepsia  hydrALAZINE Injectable 10 milliGRAM(s) IV Push every 4 hours PRN SBP>170  ondansetron Injectable 4 milliGRAM(s) IV Push every 6 hours PRN Nausea    Vital Signs Last 24 Hrs  T(C): 36.5 (06 Dec 2019 10:55), Max: 37.1 (05 Dec 2019 15:17)  T(F): 97.7 (06 Dec 2019 10:55), Max: 98.7 (05 Dec 2019 15:17)  HR: 78 (06 Dec 2019 10:55) (77 - 94)  BP: 155/76 (06 Dec 2019 10:55) (134/70 - 205/112)  BP(mean): 120 (05 Dec 2019 15:17) (120 - 120)  RR: 18 (06 Dec 2019 10:55) (17 - 20)  SpO2: 97% (06 Dec 2019 10:55) (97% - 99%)Daily     Daily I&O's Summary      PHYSICAL EXAM:    Constitutional: NAD, awake and alert, well-developed  HEENT: PERR, EOMI,  No oral cyananosis.  Neck:  supple,  No JVD  Respiratory: Breath sounds are clear bilaterally, No wheezing, rales or rhonchi  Cardiovascular: S1 and S2, regular rate and rhythm, no Murmurs, gallops or rubs  Gastrointestinal: Bowel Sounds present, soft, nontender.   Extremities: No peripheral edema. No clubbing or cyanosis.  Vascular: 2+ peripheral pulses  Neurological: A/O x 3, no focal deficits  Musculoskeletal: no calf tenderness.  Skin: No rashes.      LABS: All Labs Reviewed:                            15.9   12.09 )-----------( 202      ( 05 Dec 2019 11:39 )             47.6     12-05    133<L>  |  99  |  17  ----------------------------<  134<H>  3.1<L>   |  27  |  1.09    Ca    8.2<L>      05 Dec 2019 12:29  Mg     2.2     12-05    TPro  8.1  /  Alb  4.0  /  TBili  0.8  /  DBili  x   /  AST  54<H>  /  ALT  93<H>  /  AlkPhos  127<H>  12-05    CARDIAC MARKERS ( 05 Dec 2019 21:47 )  0.080 ng/mL / x     / x     / x     / x      CARDIAC MARKERS ( 05 Dec 2019 14:29 )  0.054 ng/mL / x     / x     / x     / x      CARDIAC MARKERS ( 05 Dec 2019 12:29 )  <0.015 ng/mL / x     / x     / x     / x          LIVER FUNCTIONS - ( 05 Dec 2019 12:29 )  Alb: 4.0 g/dL / Pro: 8.1 gm/dL / ALK PHOS: 127 U/L / ALT: 93 U/L / AST: 54 U/L / GGT: x           PT/INR - ( 05 Dec 2019 12:29 )   PT: 11.9 sec;   INR: 1.07 ratio         PTT - ( 05 Dec 2019 12:29 )  PTT:25.2 sec          < from: US Abdomen Doppler (12.06.19 @ 08:57) >  IMPRESSION:     No sonographic evidence of renal artery stenosis.      < end of copied text >    monitor sinus rhythm PVCS

## 2019-12-07 NOTE — PROGRESS NOTE ADULT - PROBLEM SELECTOR PLAN 1
SBPs improved on labetalol & lisinopril.  Increase lisinopril to 20 mg daily. Renal US neg for NEGRA, await tio levels for workup of secondary causes of htn. follow up echo

## 2019-12-07 NOTE — PROGRESS NOTE ADULT - ASSESSMENT
a/p:    # Hypertensive emergency:  Cardiology monitoring  SBPs improved on labetalol & lisinopril  s/p Labetalol IV x 2  Continue Labetalol 200mg po Bid  Increase lisinopril to 30 mg today   Renal US neg for NEGRA  Await aldosterone levels for workup of secondary causes of htn.   Telemetry- [facility]   Echocardiogram - f/u results    # Hyponatremia - resolved    # New onset seizures: possibly in setting of HTN emergency  - no anticonvulsants as per neuro  Ct brain negative  MRI: possible early onset chronic microvascular ischemic changes  EEG: negative    #Elevated Troponin   - borderline elevation likely secondary to severe hypertension  - trend troponin levels until downward trend as per Cardio

## 2019-12-07 NOTE — PROGRESS NOTE ADULT - ASSESSMENT
53 yo  male with PMH of herniated discs presented to the ED [facility] c/o witnessed seizure at work, reports he felt his vision closing on him, then lost consciousness, per his brother pt works as a , he was found on the ground, unconscious, with pale skin, preceded by shaking and followed by post episodic period of 5 minutes. No tongue biting, urinary-bowel incontinence was seen. Enroute and in ED, SBP >200, given Labetalol 10 mg IV x 2; CT brain negative.    -Seizure vs convulsive syncope.  Patient had symptoms preceding event suggestive of syncope (vision closing in). Post event confusion may have been related to hypertensive encephalopathy.  -EEG and MRI brain not suggestive of risk for seizure.  -Can get prolonged EEG as outpatient but would not start anticonvulsants at this time  -Cardiology recommendations appreciated.    Will follow intermittently as needed.

## 2019-12-07 NOTE — PROGRESS NOTE ADULT - PROBLEM SELECTOR PLAN 2
borderline elevation likely secondary to severe hypertension.  Cont. to trend trops until downward trend.  EKG , follow up echo , need ischemic work up

## 2019-12-07 NOTE — PROGRESS NOTE ADULT - SUBJECTIVE AND OBJECTIVE BOX
Interval History:  12/7/19: No new complaints. Denies headache.    MEDICATIONS  (STANDING):  labetalol 200 milliGRAM(s) Oral two times a day  lisinopril 20 milliGRAM(s) Oral daily    MEDICATIONS  (PRN):  aluminum hydroxide/magnesium hydroxide/simethicone Suspension 30 milliLiter(s) Oral every 4 hours PRN Dyspepsia  hydrALAZINE Injectable 10 milliGRAM(s) IV Push every 4 hours PRN SBP>170  ondansetron Injectable 4 milliGRAM(s) IV Push every 6 hours PRN Nausea      Allergies    No Known Allergies    Intolerances        PHYSICAL EXAM:  Vital Signs Last 24 Hrs  T(F): 98.1 (12-07-19 @ 11:02)  HR: 72 (12-07-19 @ 11:02)  BP: 174/84 (12-07-19 @ 11:02)  RR: 18 (12-07-19 @ 11:02)    GENERAL: NAD, well-groomed, well-developed  HEAD:  Atraumatic, Normocephalic  Neuro:  Awake, alert, no aphasia  CN: PERRL, EOMI, no nystagmus, no facial weakness, tongue protrudes in the midline  motor: normal tone, no pronator drift, full strength in all four extremities  sensory: intact to light touch  coordination: finger to nose intact bilaterally  DTRs: symmetric, plantar responses flexor bilaterally    LABS:                        15.9   12.09 )-----------( 202      ( 05 Dec 2019 11:39 )             47.6     12-05    133<L>  |  99  |  17  ----------------------------<  134<H>  3.1<L>   |  27  |  1.09    Ca    8.2<L>      05 Dec 2019 12:29  Mg     2.2     12-05    TPro  8.1  /  Alb  4.0  /  TBili  0.8  /  DBili  x   /  AST  54<H>  /  ALT  93<H>  /  AlkPhos  127<H>  12-05    PT/INR - ( 05 Dec 2019 12:29 )   PT: 11.9 sec;   INR: 1.07 ratio         PTT - ( 05 Dec 2019 12:29 )  PTT:25.2 sec      RADIOLOGY & ADDITIONAL STUDIES:    CT brain 12/5/19:  No CT evidence of acute intracranial abnormality. MRI should   be considered for further evaluation if this is a first time seizure.      EEG 12/6/19: normal with exception of excess beta activity    MR head no contrast 12/6/19:   Multiple foci of T2/FLAIR hyperintense signal within the   periventricular white matter, nonspecific in a patient of this age, may   reflect early onset chronic microvascular ischemic changes versus other   etiology.

## 2019-12-08 PROCEDURE — 99232 SBSQ HOSP IP/OBS MODERATE 35: CPT

## 2019-12-08 PROCEDURE — 99233 SBSQ HOSP IP/OBS HIGH 50: CPT

## 2019-12-08 RX ORDER — LISINOPRIL 2.5 MG/1
40 TABLET ORAL DAILY
Refills: 0 | Status: DISCONTINUED | OUTPATIENT
Start: 2019-12-08 | End: 2019-12-09

## 2019-12-08 RX ADMIN — Medication 10 MILLIGRAM(S): at 20:22

## 2019-12-08 RX ADMIN — Medication 200 MILLIGRAM(S): at 06:30

## 2019-12-08 RX ADMIN — LISINOPRIL 30 MILLIGRAM(S): 2.5 TABLET ORAL at 06:30

## 2019-12-08 RX ADMIN — Medication 200 MILLIGRAM(S): at 17:20

## 2019-12-08 NOTE — PROGRESS NOTE ADULT - PROBLEM SELECTOR PLAN 2
borderline elevation likely secondary to severe hypertension.  Cont. to trend trops until downward trend., need ischemic work up  will order nuclear stress test  ( explained to patient via

## 2019-12-08 NOTE — PROGRESS NOTE ADULT - ASSESSMENT
53 yo  male with PMH of herniated discs presented to the ED [facility] c/o witnessed seizure at work, reports he felt his vision closing on him, then lost consciousness, per his brother pt works as a , he was found on the ground, unconscious, with pale skin, preceded by shaking and followed by post episodic period of 5 minutes. No tongue biting, urinary-bowel incontinence was seen. Enroute and in ED, SBP >200, given Labetalol 10 mg IV x 2; CT brain negative.    -Seizure vs convulsive syncope.  Patient had symptoms preceding event suggestive of syncope (vision closing in). Post event confusion may have been related to hypertensive encephalopathy.  -EEG and MRI brain not suggestive of risk for seizure.  -Cardiology recommendations appreciated - planned for stress test.  -Patient reports that he does not go to the doctor so he is unclear if chronic hypertension has been an issue.  -Will hold off on anticonvulsants for now, but will consider prolonged EEG in the future.    Will follow intermittently as needed.

## 2019-12-08 NOTE — PROGRESS NOTE ADULT - SUBJECTIVE AND OBJECTIVE BOX
Interval History:  12/8/19: Interview conducted with [facility]  Patient reports that prior to his episode of loss of consciousness he had double vision and felt as if the room was spinning.  He denies feeling confused upon regaining consciousness.   He denies having any prior history of LOC. He denies waking up with tongue bite, incontinence or previous history of head trauma or CNS infection.    MEDICATIONS  (STANDING):  labetalol 200 milliGRAM(s) Oral two times a day  lisinopril 40 milliGRAM(s) Oral daily    MEDICATIONS  (PRN):  aluminum hydroxide/magnesium hydroxide/simethicone Suspension 30 milliLiter(s) Oral every 4 hours PRN Dyspepsia  hydrALAZINE Injectable 10 milliGRAM(s) IV Push every 4 hours PRN SBP>170  ondansetron Injectable 4 milliGRAM(s) IV Push every 6 hours PRN Nausea      Allergies    No Known Allergies    Intolerances        PHYSICAL EXAM:  Vital Signs Last 24 Hrs  T(F): 98.2 (12-08-19 @ 11:08)  HR: 70 (12-08-19 @ 11:08)  BP: 142/77 (12-08-19 @ 11:08)  RR: 18 (12-08-19 @ 11:08)    GENERAL: NAD, well-groomed, well-developed  HEAD:  Atraumatic, Normocephalic  Neuro:  Awake, alert, no aphasia  CN: PERRL, EOMI, no nystagmus, no facial weakness, tongue protrudes in the midline  motor: normal tone, no pronator drift, full strength in all four extremities  sensory: intact to light touch  coordination: finger to nose intact bilaterally  DTRs: symmetric, plantar responses flexor bilaterally    LABS:                        15.7   9.20  )-----------( 187      ( 07 Dec 2019 11:36 )             46.6     12-07    137  |  106  |  21  ----------------------------<  100<H>  3.8   |  25  |  1.01    Ca    8.8      07 Dec 2019 11:36    TPro  7.9  /  Alb  3.7  /  TBili  1.5<H>  /  DBili  x   /  AST  96<H>  /  ALT  102<H>  /  AlkPhos  100  12-07          RADIOLOGY & ADDITIONAL STUDIES:    CT brain 12/5/19:  No CT evidence of acute intracranial abnormality. MRI should   be considered for further evaluation if this is a first time seizure.      EEG 12/6/19: normal with exception of excess beta activity    MR head no contrast 12/6/19:   Multiple foci of T2/FLAIR hyperintense signal within the   periventricular white matter, nonspecific in a patient of this age, may   reflect early onset chronic microvascular ischemic changes versus other   etiology.

## 2019-12-08 NOTE — PROGRESS NOTE ADULT - ASSESSMENT
a/p:    # Hypertensive emergency:  Cardiology monitoring  SBPs improved on labetalol & lisinopril  s/p Labetalol IV x 2  Continue Labetalol 200mg po Bid  Continue lisinopril to 30 mg today   Renal US neg for NEGRA  Await aldosterone levels for workup of secondary causes of htn.   Echocardiogram - f/u results    # Hyponatremia - resolved    # New onset seizures: possibly in setting of HTN emergency  - no anticonvulsants as per neuro.  -consider prolonged EEG in the future.  Ct brain negative  MRI: possible early onset chronic microvascular ischemic changes  EEG: negative    #Elevated Troponin   - borderline elevation likely secondary to severe hypertension  - trend troponin levels until downward trend as per Cardio  -stress test in the morning for ischemic work up. if clear possible discharge a/p:    # Hypertensive emergency: resolved with BP better controlled now but will increase lisinopril to 40 mg daily and continue Labetalol 200mg po Bid  cardio input noted  Renal US neg for NEGRA  Await aldosterone levels for workup of secondary causes of htn.   Echocardiogram - f/u results    # Hyponatremia - resolved    # New onset seizures: possibly in setting of HTN emergency  - no anticonvulsants as per neuro.  -consider prolonged EEG in the future.  Ct brain negative  MRI: possible early onset chronic microvascular ischemic changes  EEG: negative    #Elevated Troponin - plan for NST in AM  - borderline elevation likely secondary to severe hypertension  - trend troponin levels until downward trend as per Cardio  -stress test in the morning for ischemic work up. if clear possible discharge

## 2019-12-08 NOTE — PROGRESS NOTE ADULT - SUBJECTIVE AND OBJECTIVE BOX
Patient is a 54y old  Male who presents with a chief complaint of hypertensive emergency (06 Dec 2019 12:31)      HPI:  53 yo male h/o herniated disc presented to the ED with a witnessed seizure while at work. Patient states he had double vision and then he lost consciousness. Per his brother patient was on the ground shaking and woke up after 5 minutes; he remained confused for some time after that.  In the ambulance he was found to have SBP >200.  ER course: given Labetalol 10mg IV x 2; CT brain negative    12/06/2019: Patient seen and examined at bed side. No reportable events over night. Patient reports no pain or headaches. Denies abdomen tenderness, calf tenderness. Patient has limited English comprehension.  needed.  12/7 -  used. pt aware his BP remains elevated and maybe cause of sz and needs further BP adjustment.  12/8: Pt seen and examined. no reportable event over night. Denies pain or weakness. Patient understands his bp has improved now 152/70 and that his stress test is tomorrow with possible discharge. D/w  [id number]    ROS:   All 10 systems reviewed and found to be negative with the exception of what has been described above.    Vital Signs Last 24 Hrs  T(C): 36.8 (08 Dec 2019 11:08), Max: 36.8 (08 Dec 2019 11:08)  T(F): 98.2 (08 Dec 2019 11:08), Max: 98.2 (08 Dec 2019 11:08)  HR: 70 (08 Dec 2019 11:08) (70 - 72)  BP: 142/77 (08 Dec 2019 11:08) (142/77 - 169/85)  BP(mean): --  RR: 18 (08 Dec 2019 11:08) (17 - 18)  SpO2: 98% (08 Dec 2019 11:08) (98% - 99%)    PHYSICAL EXAM:      GEN: lying in bed, NAD  HEENT:   NC/AT, pupils equal and reactive, EOMI  CV:  +S1, +S2, RRR  RESP:   lungs clear to auscultation bilaterally, no wheeze, rales, rhonchi   BREAST:  not examined  GI:  abdomen soft, non-tender, non-distended, normoactive BS  RECTAL:  not examined  :  not examined  MSK:   normal muscle tone  EXT:  no edema  NEURO:  AAOX3, no focal neurological deficits  SKIN:  no rashes    LABS:               15.7   9.20  )-----------( 187      ( 07 Dec 2019 11:36 )             46.6     12-07    137  |  106  |  21  ----------------------------<  100<H>  3.8   |  25  |  1.01    Ca    8.8      07 Dec 2019 11:36    TPro  7.9  /  Alb  3.7  /  TBili  1.5<H>  /  DBili  x   /  AST  96<H>  /  ALT  102<H>  /  AlkPhos  100  12-07    CARDIAC MARKERS ( 07 Dec 2019 11:36 )  <0.015 ng/mL / x     / x     / x     / x      CARDIAC MARKERS ( 05 Dec 2019 21:47 )  0.080 ng/mL / x     / x     / x     / x      CARDIAC MARKERS ( 05 Dec 2019 14:29 )  0.054 ng/mL / x     / x     / x     / x          LIVER FUNCTIONS - ( 07 Dec 2019 11:36 )  Alb: 3.7 g/dL / Pro: 7.9 gm/dL / ALK PHOS: 100 U/L / ALT: 102 U/L / AST: 96 U/L / GGT: x             RADIOLOGY & ADDITIONAL TESTS:    < from: MR Head No Cont (12.06.19 @ 12:49) >  IMPRESSION:   Multiple foci of T2/FLAIR hyperintense signal within the   periventricular white matter, nonspecific in a patient of this age, may   reflect early onset chronic microvascular ischemic changes versus other   etiology.     < from: US Abdomen Doppler (12.06.19 @ 08:57) >  IMPRESSION:   No sonographic evidence of renal artery stenosis.    < from: Xray Chest 1 View- PORTABLE-Urgent (12.05.19 @ 12:16) >  Impression:  Clear lungs.    < from: CT Head No Cont (12.05.19 @ 12:08) >  Impression: No CT evidence of acute intracranial abnormality. MRI should   be considered for further evaluation if this is a first time seizure.      MEDICATIONS  MEDICATIONS  (STANDING):  labetalol 200 milliGRAM(s) Oral two times a day  lisinopril 40 milliGRAM(s) Oral daily    MEDICATIONS  (PRN):  aluminum hydroxide/magnesium hydroxide/simethicone Suspension 30 milliLiter(s) Oral every 4 hours PRN Dyspepsia  hydrALAZINE Injectable 10 milliGRAM(s) IV Push every 4 hours PRN SBP>170  ondansetron Injectable 4 milliGRAM(s) IV Push every 6 hours PRN Nausea Patient is a 54y old  Male who presents with a chief complaint of hypertensive emergency (06 Dec 2019 12:31)      HPI:  55 yo male h/o herniated disc presented to the ED with a witnessed seizure while at work. Patient states he had double vision and then he lost consciousness. Per his brother patient was on the ground shaking and woke up after 5 minutes; he remained confused for some time after that.  In the ambulance he was found to have SBP >200.  ER course: given Labetalol 10mg IV x 2; CT brain negative    12/06/2019: Patient seen and examined at bed side. No reportable events over night. Patient reports no pain or headaches. Denies abdomen tenderness, calf tenderness. Patient has limited English comprehension.  needed.  12/7 -  used. pt aware his BP remains elevated and maybe cause of sz and needs further BP adjustment.  12/8: Pt seen and examined. no reportable event over night. Denies pain or weakness. Patient understands his bp has improved now 152/70 and that his stress test is tomorrow with possible discharge.  [id number]    ROS:   All 10 systems reviewed and found to be negative with the exception of what has been described above.    Vital Signs Last 24 Hrs  T(C): 36.8 (08 Dec 2019 11:08), Max: 36.8 (08 Dec 2019 11:08)  T(F): 98.2 (08 Dec 2019 11:08), Max: 98.2 (08 Dec 2019 11:08)  HR: 70 (08 Dec 2019 11:08) (70 - 72)  BP: 142/77 (08 Dec 2019 11:08) (142/77 - 169/85)  BP(mean): --  RR: 18 (08 Dec 2019 11:08) (17 - 18)  SpO2: 98% (08 Dec 2019 11:08) (98% - 99%)    PHYSICAL EXAM:      GEN: lying in bed, NAD  HEENT:   NC/AT, pupils equal and reactive, EOMI  CV:  +S1, +S2, RRR  RESP:   lungs clear to auscultation bilaterally, no wheeze, rales, rhonchi   BREAST:  not examined  GI:  abdomen soft, non-tender, non-distended, normoactive BS  RECTAL:  not examined  :  not examined  MSK:   normal muscle tone  EXT:  no edema  NEURO:  AAOX3, no focal neurological deficits  SKIN:  no rashes    LABS:               15.7   9.20  )-----------( 187      ( 07 Dec 2019 11:36 )             46.6     12-07    137  |  106  |  21  ----------------------------<  100<H>  3.8   |  25  |  1.01    Ca    8.8      07 Dec 2019 11:36    TPro  7.9  /  Alb  3.7  /  TBili  1.5<H>  /  DBili  x   /  AST  96<H>  /  ALT  102<H>  /  AlkPhos  100  12-07    CARDIAC MARKERS ( 07 Dec 2019 11:36 )  <0.015 ng/mL / x     / x     / x     / x      CARDIAC MARKERS ( 05 Dec 2019 21:47 )  0.080 ng/mL / x     / x     / x     / x      CARDIAC MARKERS ( 05 Dec 2019 14:29 )  0.054 ng/mL / x     / x     / x     / x          LIVER FUNCTIONS - ( 07 Dec 2019 11:36 )  Alb: 3.7 g/dL / Pro: 7.9 gm/dL / ALK PHOS: 100 U/L / ALT: 102 U/L / AST: 96 U/L / GGT: x             RADIOLOGY & ADDITIONAL TESTS:    < from: MR Head No Cont (12.06.19 @ 12:49) >  IMPRESSION:   Multiple foci of T2/FLAIR hyperintense signal within the   periventricular white matter, nonspecific in a patient of this age, may   reflect early onset chronic microvascular ischemic changes versus other   etiology.     < from: US Abdomen Doppler (12.06.19 @ 08:57) >  IMPRESSION:   No sonographic evidence of renal artery stenosis.    < from: Xray Chest 1 View- PORTABLE-Urgent (12.05.19 @ 12:16) >  Impression:  Clear lungs.    < from: CT Head No Cont (12.05.19 @ 12:08) >  Impression: No CT evidence of acute intracranial abnormality. MRI should   be considered for further evaluation if this is a first time seizure.      MEDICATIONS  MEDICATIONS  (STANDING):  labetalol 200 milliGRAM(s) Oral two times a day  lisinopril 40 milliGRAM(s) Oral daily    MEDICATIONS  (PRN):  aluminum hydroxide/magnesium hydroxide/simethicone Suspension 30 milliLiter(s) Oral every 4 hours PRN Dyspepsia  hydrALAZINE Injectable 10 milliGRAM(s) IV Push every 4 hours PRN SBP>170  ondansetron Injectable 4 milliGRAM(s) IV Push every 6 hours PRN Nausea

## 2019-12-08 NOTE — PROGRESS NOTE ADULT - ATTENDING COMMENTS
patient seen and examined with PA student Jacquelyn Valladares. I was physically present for the key portions of the evaluation and management (E/M) service provided.  I agree with the above history, physical, and plan which I have reviewed and edited where appropriate.  - case d/w patient using   - case d/w neuro - await EEG  - control BP  - f/u echo
patient seen and examined with PA student Jacquelyn Valladares. I was physically present for the key portions of the evaluation and management (E/M) service provided.  I agree with the above history, physical, and plan which I have reviewed and edited where appropriate.  - plan for stress test in AM given elevated trop  - increase lisinopril to 40 mg and continue labetatalol
encouraged patient to be compliant to low salt diet and medication ,

## 2019-12-08 NOTE — PROGRESS NOTE ADULT - SUBJECTIVE AND OBJECTIVE BOX
55 yo male h/o herniated disc presented to the ED with a witnessed seizure while at work. Patient states he had double vision and then he lost consciousness. Per his brother patient was on the ground shaking and woke up after 5 minutes; he remained confused for some time after that.  In the ambulance he was found to have SBP >200.  ER course: given Labetalol 10mg IV x 2; CT brain negative    12/6/19: no complaints overnight - no chest pain, dyspnea.  SBPs better controlled in 150s but hydralazine PRN given.  12/7/19 Patient is feeling fine , blood pressure is mild elevated ,   12/8/19 Patient is feeling fine ( via [place] ) denies any chest pain ,  BP improved ,     MEDICATIONS  (STANDING):  labetalol 200 milliGRAM(s) Oral two times a day  lisinopril 30 milliGRAM(s) Oral daily    MEDICATIONS  (PRN):  aluminum hydroxide/magnesium hydroxide/simethicone Suspension 30 milliLiter(s) Oral every 4 hours PRN Dyspepsia  hydrALAZINE Injectable 10 milliGRAM(s) IV Push every 4 hours PRN SBP>170  ondansetron Injectable 4 milliGRAM(s) IV Push every 6 hours PRN Nausea      Vital Signs Last 24 Hrs  T(C): 36.7 (08 Dec 2019 05:32), Max: 36.7 (07 Dec 2019 11:02)  T(F): 98.1 (08 Dec 2019 05:32), Max: 98.1 (07 Dec 2019 11:02)  HR: 70 (08 Dec 2019 05:32) (70 - 72)  BP: 152/70 (08 Dec 2019 05:32) (152/70 - 174/84)  BP(mean): --  RR: 18 (08 Dec 2019 05:32) (17 - 18)  SpO2: 99% (08 Dec 2019 05:32) (97% - 99%)    I&O's Summary    PHYSICAL EXAM:    Constitutional: NAD, awake and alert, well-developed  HEENT: PERR, EOMI,  No oral cyananosis.  Neck:  supple,  No JVD  Respiratory: Breath sounds are clear bilaterally, No wheezing, rales or rhonchi  Cardiovascular: S1 and S2, regular rate and rhythm, no Murmurs, gallops or rubs  Gastrointestinal: Bowel Sounds present, soft, nontender.   Extremities: No peripheral edema. No clubbing or cyanosis.  Vascular: 2+ peripheral pulses  Neurological: A/O x 3, no focal deficits  Musculoskeletal: no calf tenderness.  Skin: No rashes.      LABS: All Labs Reviewed:                        15.7   9.20  )-----------( 187      ( 07 Dec 2019 11:36 )             46.6     12-07    137  |  106  |  21  ----------------------------<  100<H>  3.8   |  25  |  1.01    Ca    8.8      07 Dec 2019 11:36    TPro  7.9  /  Alb  3.7  /  TBili  1.5<H>  /  DBili  x   /  AST  96<H>  /  ALT  102<H>  /  AlkPhos  100  12-07    CARDIAC MARKERS ( 07 Dec 2019 11:36 )  <0.015 ng/mL / x     / x     / x     / x          LIVER FUNCTIONS - ( 07 Dec 2019 11:36 )  Alb: 3.7 g/dL / Pro: 7.9 gm/dL / ALK PHOS: 100 U/L / ALT: 102 U/L / AST: 96 U/L / GGT: x               12-07 Chol 179 <H> HDL 44 Trig 83          < from: US Abdomen Doppler (12.06.19 @ 08:57) >  IMPRESSION:     No sonographic evidence of renal artery stenosis.      < end of copied text >    monitor sinus rhythm PVCS       < from: Transthoracic Echocardiogram (12.07.19 @ 08:43) >  ummary     Minor Fibrocalcific changes noted to the mitral valve leaflets with   preserved leaflet excursion.   Fibrocalcific changes noted to the Aortic valve leaflets with minimally   restricted leaflet excursion.   Trace aortic regurgitation is present.   Normal appearing tricuspid valve structure and function.   Trace tricuspid valve regurgitation is present.   The left atrium is mildly dilated.   The left ventricle is normal in size, wall thickness, wall motion and   contractility.   Estimated left ventricular ejection fraction is 55 %.   Normal appearing right ventricle structure and function.     Signature     ----------------------------------------------------------------   Electronically signed by Mack Hutchison MD(Interpreting    < end of copied text >

## 2019-12-09 ENCOUNTER — TRANSCRIPTION ENCOUNTER (OUTPATIENT)
Age: 54
End: 2019-12-09

## 2019-12-09 VITALS
RESPIRATION RATE: 18 BRPM | SYSTOLIC BLOOD PRESSURE: 152 MMHG | HEART RATE: 83 BPM | OXYGEN SATURATION: 100 % | TEMPERATURE: 98 F | DIASTOLIC BLOOD PRESSURE: 84 MMHG

## 2019-12-09 PROCEDURE — 93018 CV STRESS TEST I&R ONLY: CPT

## 2019-12-09 PROCEDURE — 78452 HT MUSCLE IMAGE SPECT MULT: CPT | Mod: 26

## 2019-12-09 PROCEDURE — 99233 SBSQ HOSP IP/OBS HIGH 50: CPT

## 2019-12-09 PROCEDURE — 93016 CV STRESS TEST SUPVJ ONLY: CPT

## 2019-12-09 RX ORDER — LABETALOL HCL 100 MG
1 TABLET ORAL
Qty: 60 | Refills: 0
Start: 2019-12-09 | End: 2020-01-07

## 2019-12-09 RX ORDER — LISINOPRIL 2.5 MG/1
1 TABLET ORAL
Qty: 30 | Refills: 0
Start: 2019-12-09 | End: 2020-01-07

## 2019-12-09 RX ADMIN — LISINOPRIL 40 MILLIGRAM(S): 2.5 TABLET ORAL at 05:45

## 2019-12-09 RX ADMIN — Medication 200 MILLIGRAM(S): at 05:45

## 2019-12-09 NOTE — DISCHARGE NOTE PROVIDER - HOSPITAL COURSE
pcp - new pt appt made for [facility] with dr beckwith on 12/18         53 yo male h/o herniated disc presented to the ED with a witnessed seizure while at work. Patient states he had double vision and then he lost consciousness. Per his brother patient was on the ground shaking and woke up after 5 minutes; he remained confused for some time after that.    In the ambulance he was found to have SBP >200.    ER course: given Labetalol 10mg IV x 2; CT brain negative        12/06/2019: Patient seen and examined at bed side. No reportable events over night. Patient reports no pain or headaches. Denies abdomen tenderness, calf tenderness. Patient has limited English comprehension.  needed.    12/7 -  used. pt aware his BP remains elevated and maybe cause of sz and needs further BP adjustment.    12/8: Pt seen and examined. no reportable event over night. Denies pain or weakness. Patient understands his bp has improved now 152/70 and that his stress test is tomorrow with possible discharge.  [id number]    12/9 - feels well.  spoke with patient regarding his dx and need for med compliance with vianney caballero translating instructions.  pt aware of appt on 12/18.          RADIOLOGY & ADDITIONAL TESTS:        < from: MR Head No Cont (12.06.19 @ 12:49) >    IMPRESSION:   Multiple foci of T2/FLAIR hyperintense signal within the     periventricular white matter, nonspecific in a patient of this age, may     reflect early onset chronic microvascular ischemic changes versus other     etiology.         < from: US Abdomen Doppler (12.06.19 @ 08:57) >    IMPRESSION:     No sonographic evidence of renal artery stenosis.        < from: Xray Chest 1 View- PORTABLE-Urgent (12.05.19 @ 12:16) >    Impression:    Clear lungs.        < from: CT Head No Cont (12.05.19 @ 12:08) >    Impression: No CT evidence of acute intracranial abnormality. MRI should     be considered for further evaluation if this is a first time seizure.        < from: Transthoracic Echocardiogram (12.07.19 @ 08:43) >         Minor Fibrocalcific changes noted to the mitral valve leaflets with     preserved leaflet excursion.     Fibrocalcific changes noted to the Aortic valve leaflets with minimally     restricted leaflet excursion.     Trace aortic regurgitation is present.     Normal appearing tricuspid valve structure and function.     Trace tricuspid valve regurgitation is present.     The left atrium is mildly dilated.     The left ventricle is normal in size, wall thickness, wall motion and     contractility.     Estimated left ventricular ejection fraction is 55 %.     Normal appearing right ventricle structure and function.        < end of copied text >                # Hypertensive emergency: resolved     - continue lisinopril to 40 mg daily and continue Labetalol 200mg po Bid. further adjustments at [facility] center on f/u     - secondary causes of htn workup negative so far plan to complete further workup as outpt [facility] appt.  no renal artery stenosis. tio level nl         # New onset seizures: likely 2/2 HTN emergency    - no anticonvulsants as per neuro.    Ct brain negative    MRI: possible early onset chronic microvascular ischemic changes    EEG: negative        #Elevated Troponin - likely 2/2 HTN emergency - stress test negative    - outpt f/u with cardio        total time 35 mins

## 2019-12-09 NOTE — DISCHARGE NOTE PROVIDER - NSDCCPCAREPLAN_GEN_ALL_CORE_FT
PRINCIPAL DISCHARGE DIAGNOSIS  Diagnosis: Seizure  Assessment and Plan of Treatment:       SECONDARY DISCHARGE DIAGNOSES  Diagnosis: Hypertensive emergency  Assessment and Plan of Treatment:

## 2019-12-09 NOTE — PROGRESS NOTE ADULT - PROBLEM SELECTOR PLAN 1
SBPs improved on labetalol & lisinopril.  Increase lisinopril to 20 mg daily. Renal US neg for NEGRA, Hang levels normal. Echo w/ normal EF & normal (?) LV thickness.    Followup in cardiology clinic in 2 weeks for treatment of hypertension.

## 2019-12-09 NOTE — DISCHARGE NOTE PROVIDER - CARE PROVIDERS DIRECT ADDRESSES
[email or web address]

## 2019-12-09 NOTE — DISCHARGE NOTE NURSING/CASE MANAGEMENT/SOCIAL WORK - PATIENT PORTAL LINK FT
You can access the FollowMyHealth Patient Portal offered by [facility] by registering at the following website: [email or web address]. By joining "[employer]’s FollowMyHealth portal, you will also be able to view your health information using other applications (apps) compatible with our system.

## 2019-12-09 NOTE — DISCHARGE NOTE PROVIDER - CARE PROVIDER_API CALL
Yamilex Razo)  Family Medicine  [address] [place] [address]  Phone: [phone]  Fax: [phone]  Scheduled Appointment: 12/18/2019    Palla, Venugopal R (MD)  Cardiovascular Disease; Internal Medicine  [address], [place] [id number]  Phone: [phone]  Fax: [phone]  Follow Up Time:

## 2019-12-09 NOTE — PROGRESS NOTE ADULT - REASON FOR ADMISSION
loss of consciousness
seizure
hypertensive emergency

## 2019-12-09 NOTE — DISCHARGE NOTE PROVIDER - NSDCMRMEDTOKEN_GEN_ALL_CORE_FT
labetalol 200 mg oral tablet: 1 tab(s) orally 2 times a day  lisinopril 40 mg oral tablet: 1 tab(s) orally once a day

## 2019-12-09 NOTE — DISCHARGE NOTE PROVIDER - NSDCPNSUBOBJ_GEN_ALL_CORE
53 yo male h/o herniated disc presented to the ED with a witnessed seizure while at work. Patient states he had double vision and then he lost consciousness. Per his brother patient was on the ground shaking and woke up after 5 minutes; he remained confused for some time after that.    In the ambulance he was found to have SBP >200.    ER course: given Labetalol 10mg IV x 2; CT brain negative        12/06/2019: Patient seen and examined at bed side. No reportable events over night. Patient reports no pain or headaches. Denies abdomen tenderness, calf tenderness. Patient has limited English comprehension.  needed.    12/7 -  used. pt aware his BP remains elevated and maybe cause of sz and needs further BP adjustment.    12/8: Pt seen and examined. no reportable event over night. Denies pain or weakness. Patient understands his bp has improved now 152/70 and that his stress test is tomorrow with possible discharge.  [id number]    12/9 - feels well.  spoke with patient regarding his dx and need for med compliance with vianney caballero translating instructions.  pt aware of appt on 12/18.          RADIOLOGY & ADDITIONAL TESTS:        < from: MR Head No Cont (12.06.19 @ 12:49) >    IMPRESSION:   Multiple foci of T2/FLAIR hyperintense signal within the     periventricular white matter, nonspecific in a patient of this age, may     reflect early onset chronic microvascular ischemic changes versus other     etiology.         < from: US Abdomen Doppler (12.06.19 @ 08:57) >    IMPRESSION:     No sonographic evidence of renal artery stenosis.        < from: Xray Chest 1 View- PORTABLE-Urgent (12.05.19 @ 12:16) >    Impression:    Clear lungs.        < from: CT Head No Cont (12.05.19 @ 12:08) >    Impression: No CT evidence of acute intracranial abnormality. MRI should     be considered for further evaluation if this is a first time seizure.            # Hypertensive emergency: resolved with BP better controlled now but will increase lisinopril to 40 mg daily and continue Labetalol 200mg po Bid    cardio input noted    Renal US neg for NEGRA    Await aldosterone levels for workup of secondary causes of htn.     Echocardiogram - f/u results        # Hyponatremia - resolved        # New onset seizures: possibly in setting of HTN emergency    - no anticonvulsants as per neuro.    -consider prolonged EEG in the future.    Ct brain negative    MRI: possible early onset chronic microvascular ischemic changes    EEG: negative        #Elevated Troponin - plan for NST in AM    - borderline elevation likely secondary to severe hypertension    - trend troponin levels until downward trend as per Cardio    -stress test in the morning for ischemic work up. if clear possible discharge

## 2019-12-09 NOTE — PROGRESS NOTE ADULT - SUBJECTIVE AND OBJECTIVE BOX
53 yo male h/o herniated disc presented to the ED with a witnessed seizure while at work. Patient states he had double vision and then he lost consciousness. Per his brother patient was on the ground shaking and woke up after 5 minutes; he remained confused for some time after that.  In the ambulance he was found to have SBP >200.  ER course: given Labetalol 10mg IV x 2; CT brain negative    12/6/19: no complaints overnight - no chest pain, dyspnea.  SBPs better controlled in 150s but hydralazine PRN given.  12/7/19 Patient is feeling fine , blood pressure is mild elevated ,   12/8/19 Patient is feeling fine ( via [place] ) denies any chest pain ,  BP improved ,   12/9/19 No chest pain, palpitations, dypsnea.  Reviewed results of echo & stress test.        MEDICATIONS:  MEDICATIONS  (STANDING):  labetalol 200 milliGRAM(s) Oral two times a day  lisinopril 40 milliGRAM(s) Oral daily    MEDICATIONS  (PRN):  aluminum hydroxide/magnesium hydroxide/simethicone Suspension 30 milliLiter(s) Oral every 4 hours PRN Dyspepsia  hydrALAZINE Injectable 10 milliGRAM(s) IV Push every 4 hours PRN SBP>170  ondansetron Injectable 4 milliGRAM(s) IV Push every 6 hours PRN Nausea        Vital Signs Last 24 Hrs  T(C): 36.7 (09 Dec 2019 12:42), Max: 37.2 (08 Dec 2019 17:21)  T(F): 98 (09 Dec 2019 12:42), Max: 99 (08 Dec 2019 17:21)  HR: 83 (09 Dec 2019 12:42) (75 - 83)  BP: 152/84 (09 Dec 2019 12:42) (136/78 - 177/91)  BP(mean): --  RR: 18 (09 Dec 2019 12:42) (17 - 18)  SpO2: 100% (09 Dec 2019 12:42) (99% - 100%)    I&O's Summary      PHYSICAL EXAM:    Constitutional: NAD, awake and alert, well-developed  HEENT: PERR, EOMI,  No oral cyananosis.  Neck:  supple,  No JVD  Respiratory: Breath sounds are clear bilaterally, No wheezing, rales or rhonchi  Cardiovascular: S1 and S2, regular rate and rhythm, no Murmurs, gallops or rubs  Gastrointestinal: Bowel Sounds present, soft, nontender.   Extremities: No peripheral edema. No clubbing or cyanosis.  Vascular: 2+ peripheral pulses  Neurological: A/O x 3, no focal deficits  Musculoskeletal: no calf tenderness.  Skin: No rashes.      LABS: All Labs Reviewed:                        15.7   9.20  )-----------( 187      ( 07 Dec 2019 11:36 )             46.6     07 Dec 2019 11:36    137    |  106    |  21     ----------------------------<  100    3.8     |  25     |  1.01     Ca    8.8        07 Dec 2019 11:36    TPro  7.9    /  Alb  3.7    /  TBili  1.5    /  DBili  x      /  AST  96     /  ALT  102    /  AlkPhos  100    07 Dec 2019 11:36    < from: Transthoracic Echocardiogram (12.07.19 @ 08:43) >   Minor Fibrocalcific changes noted to the mitral valve leaflets with   preserved leaflet excursion.   Fibrocalcific changes noted to the Aortic valve leaflets with minimally   restricted leaflet excursion.   Trace aortic regurgitation is present.   Normal appearing tricuspid valve structure and function.   Trace tricuspid valve regurgitation is present.   The left atrium is mildly dilated.   The left ventricle is normal in size, wall thickness, wall motion and   contractility.   Estimated left ventricular ejection fraction is 55 %.   Normal appearing right ventricle structure and function.     Signature     ----------------------------------------------------------------   Electronically signed by Mack Hutchison MD(Interpreting    < end of copied text >    < from: NM Nuclear Stress Multiple (12.09.19 @ 11:50) >  IMPRESSION: Normal SPECT Myocardial Perfusion Imaging.    No scan evidence of reversible or fixed perfusion defects.    Normal left ventricular contractility with an ejection fraction of 63%   (Normal: 50% or greater).    No regional wall motion abnormalities.    Please refer to cardiac stress test report for maximum heart rate   achieved, EKG findings and symptoms during the procedure.      JOHNSON GONZALES   This document has been electronically signed. Dec  9 2019 12:45PM

## 2019-12-11 DIAGNOSIS — I16.1 HYPERTENSIVE EMERGENCY: ICD-10-CM

## 2019-12-11 DIAGNOSIS — R56.9 UNSPECIFIED CONVULSIONS: ICD-10-CM

## 2019-12-11 DIAGNOSIS — R10.13 EPIGASTRIC PAIN: ICD-10-CM

## 2019-12-11 DIAGNOSIS — I10 ESSENTIAL (PRIMARY) HYPERTENSION: ICD-10-CM

## 2019-12-11 DIAGNOSIS — R55 SYNCOPE AND COLLAPSE: ICD-10-CM

## 2019-12-11 DIAGNOSIS — I67.4 HYPERTENSIVE ENCEPHALOPATHY: ICD-10-CM

## 2019-12-11 DIAGNOSIS — R79.89 OTHER SPECIFIED ABNORMAL FINDINGS OF BLOOD CHEMISTRY: ICD-10-CM

## 2019-12-11 DIAGNOSIS — E87.1 HYPO-OSMOLALITY AND HYPONATREMIA: ICD-10-CM

## 2019-12-11 LAB — RENIN PLAS-CCNC: 0.86 NG/ML/HR — SIGNIFICANT CHANGE UP (ref 0.17–5.38)

## 2019-12-19 ENCOUNTER — INPATIENT (INPATIENT)
Facility: HOSPITAL | Age: 54
LOS: 4 days | Discharge: HOME | DRG: 199 | End: 2019-12-24
Attending: FAMILY MEDICINE | Admitting: OBSTETRICS & GYNECOLOGY
Payer: MEDICAID

## 2019-12-19 VITALS — WEIGHT: 166.01 LBS | HEIGHT: 61 IN

## 2019-12-19 DIAGNOSIS — E87.1 HYPO-OSMOLALITY AND HYPONATREMIA: ICD-10-CM

## 2019-12-19 LAB
ADD ON TEST-SPECIMEN IN LAB: SIGNIFICANT CHANGE UP
ALBUMIN SERPL ELPH-MCNC: 4.1 G/DL — SIGNIFICANT CHANGE UP (ref 3.3–5)
ALP SERPL-CCNC: 120 U/L — SIGNIFICANT CHANGE UP (ref 40–120)
ALT FLD-CCNC: 76 U/L — SIGNIFICANT CHANGE UP (ref 12–78)
ANION GAP SERPL CALC-SCNC: 6 MMOL/L — SIGNIFICANT CHANGE UP (ref 5–17)
ANION GAP SERPL CALC-SCNC: 6 MMOL/L — SIGNIFICANT CHANGE UP (ref 5–17)
APPEARANCE UR: CLEAR — SIGNIFICANT CHANGE UP
AST SERPL-CCNC: 38 U/L — HIGH (ref 15–37)
BASOPHILS # BLD AUTO: 0.04 K/UL — SIGNIFICANT CHANGE UP (ref 0–0.2)
BASOPHILS NFR BLD AUTO: 0.5 % — SIGNIFICANT CHANGE UP (ref 0–2)
BILIRUB SERPL-MCNC: 1 MG/DL — SIGNIFICANT CHANGE UP (ref 0.2–1.2)
BILIRUB UR-MCNC: NEGATIVE — SIGNIFICANT CHANGE UP
BUN SERPL-MCNC: 15 MG/DL — SIGNIFICANT CHANGE UP (ref 7–23)
BUN SERPL-MCNC: 15 MG/DL — SIGNIFICANT CHANGE UP (ref 7–23)
CALCIUM SERPL-MCNC: 8.7 MG/DL — SIGNIFICANT CHANGE UP (ref 8.5–10.1)
CALCIUM SERPL-MCNC: 8.9 MG/DL — SIGNIFICANT CHANGE UP (ref 8.5–10.1)
CHLORIDE SERPL-SCNC: 86 MMOL/L — LOW (ref 96–108)
CHLORIDE SERPL-SCNC: 94 MMOL/L — LOW (ref 96–108)
CO2 SERPL-SCNC: 25 MMOL/L — SIGNIFICANT CHANGE UP (ref 22–31)
CO2 SERPL-SCNC: 27 MMOL/L — SIGNIFICANT CHANGE UP (ref 22–31)
COLOR SPEC: YELLOW — SIGNIFICANT CHANGE UP
CREAT SERPL-MCNC: 1.05 MG/DL — SIGNIFICANT CHANGE UP (ref 0.5–1.3)
CREAT SERPL-MCNC: 1.1 MG/DL — SIGNIFICANT CHANGE UP (ref 0.5–1.3)
DIFF PNL FLD: NEGATIVE — SIGNIFICANT CHANGE UP
EOSINOPHIL # BLD AUTO: 0.08 K/UL — SIGNIFICANT CHANGE UP (ref 0–0.5)
EOSINOPHIL NFR BLD AUTO: 0.9 % — SIGNIFICANT CHANGE UP (ref 0–6)
GLUCOSE SERPL-MCNC: 109 MG/DL — HIGH (ref 70–99)
GLUCOSE SERPL-MCNC: 113 MG/DL — HIGH (ref 70–99)
GLUCOSE UR QL: NEGATIVE MG/DL — SIGNIFICANT CHANGE UP
HCT VFR BLD CALC: 40.3 % — SIGNIFICANT CHANGE UP (ref 39–50)
HGB BLD-MCNC: 14.4 G/DL — SIGNIFICANT CHANGE UP (ref 13–17)
IMM GRANULOCYTES NFR BLD AUTO: 0.2 % — SIGNIFICANT CHANGE UP (ref 0–1.5)
KETONES UR-MCNC: NEGATIVE — SIGNIFICANT CHANGE UP
LEUKOCYTE ESTERASE UR-ACNC: NEGATIVE — SIGNIFICANT CHANGE UP
LYMPHOCYTES # BLD AUTO: 2.17 K/UL — SIGNIFICANT CHANGE UP (ref 1–3.3)
LYMPHOCYTES # BLD AUTO: 24.6 % — SIGNIFICANT CHANGE UP (ref 13–44)
MAGNESIUM SERPL-MCNC: 2 MG/DL — SIGNIFICANT CHANGE UP (ref 1.6–2.6)
MCHC RBC-ENTMCNC: 30.5 PG — SIGNIFICANT CHANGE UP (ref 27–34)
MCHC RBC-ENTMCNC: 35.7 GM/DL — SIGNIFICANT CHANGE UP (ref 32–36)
MCV RBC AUTO: 85.4 FL — SIGNIFICANT CHANGE UP (ref 80–100)
MONOCYTES # BLD AUTO: 0.93 K/UL — HIGH (ref 0–0.9)
MONOCYTES NFR BLD AUTO: 10.5 % — SIGNIFICANT CHANGE UP (ref 2–14)
NEUTROPHILS # BLD AUTO: 5.58 K/UL — SIGNIFICANT CHANGE UP (ref 1.8–7.4)
NEUTROPHILS NFR BLD AUTO: 63.3 % — SIGNIFICANT CHANGE UP (ref 43–77)
NITRITE UR-MCNC: NEGATIVE — SIGNIFICANT CHANGE UP
PH UR: 7 — SIGNIFICANT CHANGE UP (ref 5–8)
PHOSPHATE SERPL-MCNC: 2.7 MG/DL — SIGNIFICANT CHANGE UP (ref 2.5–4.5)
PLATELET # BLD AUTO: 259 K/UL — SIGNIFICANT CHANGE UP (ref 150–400)
POTASSIUM SERPL-MCNC: 3.7 MMOL/L — SIGNIFICANT CHANGE UP (ref 3.5–5.3)
POTASSIUM SERPL-MCNC: 4.2 MMOL/L — SIGNIFICANT CHANGE UP (ref 3.5–5.3)
POTASSIUM SERPL-SCNC: 3.7 MMOL/L — SIGNIFICANT CHANGE UP (ref 3.5–5.3)
POTASSIUM SERPL-SCNC: 4.2 MMOL/L — SIGNIFICANT CHANGE UP (ref 3.5–5.3)
PROT SERPL-MCNC: 7.9 GM/DL — SIGNIFICANT CHANGE UP (ref 6–8.3)
PROT UR-MCNC: NEGATIVE MG/DL — SIGNIFICANT CHANGE UP
RBC # BLD: 4.72 M/UL — SIGNIFICANT CHANGE UP (ref 4.2–5.8)
RBC # FLD: 11.1 % — SIGNIFICANT CHANGE UP (ref 10.3–14.5)
SODIUM SERPL-SCNC: 119 MMOL/L — CRITICAL LOW (ref 135–145)
SODIUM SERPL-SCNC: 125 MMOL/L — LOW (ref 135–145)
SP GR SPEC: 1.01 — SIGNIFICANT CHANGE UP (ref 1.01–1.02)
UROBILINOGEN FLD QL: NEGATIVE MG/DL — SIGNIFICANT CHANGE UP
WBC # BLD: 8.82 K/UL — SIGNIFICANT CHANGE UP (ref 3.8–10.5)
WBC # FLD AUTO: 8.82 K/UL — SIGNIFICANT CHANGE UP (ref 3.8–10.5)

## 2019-12-19 PROCEDURE — 85027 COMPLETE CBC AUTOMATED: CPT

## 2019-12-19 PROCEDURE — 84300 ASSAY OF URINE SODIUM: CPT

## 2019-12-19 PROCEDURE — 93010 ELECTROCARDIOGRAM REPORT: CPT

## 2019-12-19 PROCEDURE — 84100 ASSAY OF PHOSPHORUS: CPT

## 2019-12-19 PROCEDURE — 36415 COLL VENOUS BLD VENIPUNCTURE: CPT

## 2019-12-19 PROCEDURE — 83036 HEMOGLOBIN GLYCOSYLATED A1C: CPT

## 2019-12-19 PROCEDURE — 80048 BASIC METABOLIC PNL TOTAL CA: CPT

## 2019-12-19 PROCEDURE — 83835 ASSAY OF METANEPHRINES: CPT

## 2019-12-19 PROCEDURE — 82803 BLOOD GASES ANY COMBINATION: CPT

## 2019-12-19 PROCEDURE — 83930 ASSAY OF BLOOD OSMOLALITY: CPT

## 2019-12-19 PROCEDURE — 71045 X-RAY EXAM CHEST 1 VIEW: CPT | Mod: 26

## 2019-12-19 PROCEDURE — 83735 ASSAY OF MAGNESIUM: CPT

## 2019-12-19 PROCEDURE — 83935 ASSAY OF URINE OSMOLALITY: CPT

## 2019-12-19 RX ORDER — LISINOPRIL 2.5 MG/1
40 TABLET ORAL DAILY
Refills: 0 | Status: DISCONTINUED | OUTPATIENT
Start: 2019-12-20 | End: 2019-12-20

## 2019-12-19 RX ORDER — SODIUM CHLORIDE 9 MG/ML
1000 INJECTION INTRAMUSCULAR; INTRAVENOUS; SUBCUTANEOUS ONCE
Refills: 0 | Status: COMPLETED | OUTPATIENT
Start: 2019-12-19 | End: 2019-12-19

## 2019-12-19 RX ORDER — LABETALOL HCL 100 MG
200 TABLET ORAL
Refills: 0 | Status: DISCONTINUED | OUTPATIENT
Start: 2019-12-19 | End: 2019-12-24

## 2019-12-19 RX ADMIN — SODIUM CHLORIDE 2000 MILLILITER(S): 9 INJECTION INTRAMUSCULAR; INTRAVENOUS; SUBCUTANEOUS at 16:23

## 2019-12-19 RX ADMIN — SODIUM CHLORIDE 1000 MILLILITER(S): 9 INJECTION INTRAMUSCULAR; INTRAVENOUS; SUBCUTANEOUS at 17:20

## 2019-12-19 NOTE — ED ADULT TRIAGE NOTE - CHIEF COMPLAINT QUOTE
Pt WI sent by MD states abnormal lab work yesterday, does not know what is abnormal, has no complaints, no medical history, no medications. BGM at triage 114

## 2019-12-19 NOTE — H&P ADULT - NSHPLABSRESULTS_GEN_ALL_CORE
Labs personally reviewed and interpreted. Notable for no leukocytosis (WBC 8.82). Hb normal at 14.4, plts 259. Na 119 (was 137 two weeks prior), K 4.2, HCO3 27, AG 6. BUN/creatinine at baseline at 15/1.05. . Calcium 8.9, Mg 2.0, Phos 2.7. Alk phos 120, AST slightly elevated at 38 but markedly improved from previous hospitalization. ALT normal at 76. TSH 1.67 with normal free T4 of 1.1.    CXR personally reviewed and interpreted. Notable for clear lungs, no effusions, focal consolidations, interstitial markings, pneumothorax, or obvious cardiomegaly.    EKG personally reviewed. Normal sinus rhythm, normal axis. Unchanged RBBB with slightly improved Q waves in the inferior leads. Rate 66, , QTc 457. No ST elevations or depressions.

## 2019-12-19 NOTE — ED STATDOCS - PROGRESS NOTE DETAILS
55 y/o M presents with hyponatremia. pt states he went to his PMD for routine visit, had BW drawn which showed sodium of 122. Pt denies fever/chills, n/v/d, CP, SOB, abd pain, or other complaints at this time. -Edgar Cotter PA-C Case discussed with Dr. Damico who will admit pt. -Edgar Cotter PA-C

## 2019-12-19 NOTE — H&P ADULT - NSHPSOCIALHISTORY_GEN_ALL_CORE
No smoking or drug use history.  Drinks "3 beers a day" on a daily basis.  Lives with his sister.  Works in [employer].

## 2019-12-19 NOTE — H&P ADULT - NSHPPHYSICALEXAM_GEN_ALL_CORE
Vital Signs Last 24 Hrs  T(C): 36.7 (19 Dec 2019 21:54), Max: 36.9 (19 Dec 2019 15:40)  T(F): 98 (19 Dec 2019 21:54), Max: 98.5 (19 Dec 2019 15:40)  HR: 80 (19 Dec 2019 21:54) (70 - 80)  BP: 162/88 (19 Dec 2019 21:54) (152/83 - 173/89)  BP(mean): 114 (19 Dec 2019 15:40) (114 - 114)  RR: 16 (19 Dec 2019 21:54) (16 - 16)  SpO2: 100% (19 Dec 2019 21:54) (100% - 100%)    GENERAL: No acute distress  HEENT: PERRL, EOMI, MM dry, no oropharyngeal lesions  NECK: supple, no stiffness, no JVD, no thyromegaly  PULM: respirations non-labored, clear to auscultation bilaterally, no rales, rhonchi, or wheezes  CV: regular rate and rhythm, no murmurs, gallops, or rubs  GI: abdomen soft, nontender, nondistended, no masses felt, normal bowel sounds  MSK: strength 5/5 bilateral upper/lower extremities. No joint swelling, erythema, or warmth.  LYMPH: no anterior cervical, posterior cervical, supraclavicular, axillary, or inguinal lymphadenopathy  NEURO: A&Ox3, no tremors, sensation intact  SKIN: Poor skin turgor. No rashes, lesions, or edema

## 2019-12-19 NOTE — H&P ADULT - HISTORY OF PRESENT ILLNESS
53 yo Burkinan-speaking male with a PMH HTN who presents with low sodium. He went to see his primary care physician, who told him he had a sodium of 122 and recommended he come to the ED. The patient feels largely well. He was in the hospital from 12/5-12/9 after having a seizure. At that time, EEG was normal, stress test and TTE were largely normal, and MRI non-contrast notes possible nonspecific periventricular changes. He was determined to have a convulsive syncope vs possible seizure likely due to hypertension (SBP 200s). Since leaving the hospital he has felt slightly "weak" but feels his appetite and weight are normal/stable. He has noted a slight NP cough for the past 3 days but otherwise has not had fevers, chills, CP, SOB, abdominal pain, rash, swelling, N/V, diarrhea, or dysuria.  He says his SBPs have been around 170s but denies headaches. Over the past few years, he has been having gradually generalized blurry vision.  He he states he drinks 3 beers per day, and has not had issues with alcohol withdrawal in the past.  He drinks about a gallon of water per day, urinates about 3 times per day, and has been doing about the same routine since childhood.    In the ED, he was given 1L NS x1.

## 2019-12-19 NOTE — H&P ADULT - NSHPREVIEWOFSYSTEMS_GEN_ALL_CORE
Gen: + fatigue. Negative for fevers, chills, weight loss, weight gain, or malaise  Eyes: + chronic blurred vision. No lacrimation  ENT: no tinnitus, vertigo, or decreased hearing  Resp: no wheezing, dyspnea, pleuritic chest pain, hemoptysis, or orthopnea  CV: no chest pain, dyspnea on exertion, or palpitations  GI: no nausea, vomiting, abdominal pain, diarrhea, constipation, melena, or hematochezia  : no dysuria, hematuria, discharge, or incontinence  MSK: no arthralgias, joint swelling, or myalgias  Neuro: no focal deficits, confusion, weakness, dizziness, tremors, or seizures  Skin: no rash, lesions, or edema

## 2019-12-19 NOTE — ED STATDOCS - ATTENDING CONTRIBUTION TO CARE
I, Kassandra Perez MD,  performed the initial face to face bedside interview with this patient regarding history of present illness, review of symptoms and relevant past medical, social and family history.  I completed an independent physical examination.  I was the initial provider who evaluated this patient. I have signed out the follow up of any pending tests (i.e. labs, radiological studies) to the ACP.  I have communicated the patient’s plan of care and disposition with the ACP.  The history, relevant review of systems, past medical and surgical history, medical decision making, and physical examination was documented by the scribe in my presence and I attest to the accuracy of the documentation.

## 2019-12-19 NOTE — ED STATDOCS - OBJECTIVE STATEMENT
55 y/o M with a PMHx of HTN, HLD presents to the ED sent in by MD Munguia for evaluation of a sodium of 122. Pt states he went for a routine visit and had labs drawn which showed low sodium so pt sent to ED for evaluation. Pt states he felt febrile, but does not have a fever. Denies N/V/D, or recent travel. Pt is Ivorian speaking; int #: [id number].

## 2019-12-19 NOTE — H&P ADULT - ASSESSMENT
55 yo Khmer-speaking male with a PMH HTN who presents with hyponatremia of 119 on admission.    1) Hyponatremia  - Unclear etiology but patient does appear hypovolemic on exam  - Suspect patient has had decreased PO intake since most recent hospitalization/seizure  - Alcohol use may also be contributing, although patient would be more euvolemic  - No focal neurological deficits on exam  - TSH normal  - Not medication induced  - No pain/nausea to suggest SIADH  - Check VBG to evaluate for RTA  - Received 1L NS in ED, recheck Na after  - F/u serum and urine osmolalities, and urine sodium  - Check BMPs Q6 hours  - Goal Na correction of no more than 6-8 mEq/24 hours to avoid central pontine myelinolysis    2) HTN  - BPs elevated inpatient (SBPs 150s-170s)  - C/w Labetalol 200 mg BID  - C/w Lisinopril 40 mg QD (pt has developed a slight cough the last few days, does not bother him, but if persistent/worsening, would consider switching Lisinopril to alternative)    3) Prophylactic measure  - DVT PPX: IMPROVE score of 0, low risk, no pharmacological PPX needed, will give IPCs  - Diet: DASH  - Dispo: pending slow normalization or near-normalization of sodium 55 yo Yakut-speaking male with a PMH HTN who presents with hyponatremia of 119 on admission.    1) Hyponatremia  - Unclear etiology but patient does appear hypovolemic on exam  - Suspect patient has had decreased PO intake since most recent hospitalization/seizure  - Alcohol use may also be contributing, although patient would be more euvolemic  - No focal neurological deficits on exam  - TSH normal  - Not medication induced  - No pain/nausea to suggest SIADH  - Check VBG to evaluate for RTA  - Received 1L NS in ED, recheck Na after  - F/u serum and urine osmolalities, and urine sodium  - Check BMPs Q6 hours  - Depending on labwork, would likely start on maintenance NS  - Goal Na correction of no more than 6-8 mEq/24 hours to avoid central pontine myelinolysis  - Seizure precautions    2) HTN  - BPs elevated inpatient (SBPs 150s-170s)  - C/w Labetalol 200 mg BID  - C/w Lisinopril 40 mg QD (pt has developed a slight cough the last few days, does not bother him, but if persistent/worsening, would consider switching Lisinopril to alternative)    3) Prophylactic measure  - DVT PPX: IMPROVE score of 0, low risk, no pharmacological PPX needed, will give IPCs  - Diet: DASH  - Dispo: pending slow normalization or near-normalization of sodium 55 yo Mohawk-speaking male with a PMH HTN who presents with hyponatremia of 119 on admission.    1) Hyponatremia  - Unclear etiology but patient does appear hypovolemic on exam  - Suspect patient has had decreased PO intake since most recent hospitalization/seizure  - Alcohol use may also be contributing, although patient would be more euvolemic  - No focal neurological deficits on exam  - TSH normal  - Not medication induced  - No pain/nausea to suggest SIADH  - Check VBG to evaluate for RTA  - Check A1C in AM to r/o diabetes as a cause of urinary frequency  - Received 1L NS in ED, recheck Na after  - F/u serum and urine osmolalities, and urine sodium  - Check BMPs Q6 hours  - Depending on labwork, would likely start on maintenance NS  - Goal Na correction of no more than 6-8 mEq/24 hours to avoid central pontine myelinolysis  - Seizure precautions    2) HTN  - BPs elevated inpatient (SBPs 150s-170s)  - C/w Labetalol 200 mg BID  - C/w Lisinopril 40 mg QD (pt has developed a slight cough the last few days, does not bother him, but if persistent/worsening, would consider switching Lisinopril to alternative)    3) Prophylactic measure  - DVT PPX: IMPROVE score of 0, low risk, no pharmacological PPX needed, will give IPCs  - Diet: DASH  - Dispo: pending slow normalization or near-normalization of sodium

## 2019-12-20 LAB
ANION GAP SERPL CALC-SCNC: 8 MMOL/L — SIGNIFICANT CHANGE UP (ref 5–17)
ANION GAP SERPL CALC-SCNC: 8 MMOL/L — SIGNIFICANT CHANGE UP (ref 5–17)
BASE EXCESS BLDV CALC-SCNC: -0.6 MMOL/L — SIGNIFICANT CHANGE UP (ref -2–2)
BUN SERPL-MCNC: 11 MG/DL — SIGNIFICANT CHANGE UP (ref 7–23)
BUN SERPL-MCNC: 15 MG/DL — SIGNIFICANT CHANGE UP (ref 7–23)
CALCIUM SERPL-MCNC: 8.8 MG/DL — SIGNIFICANT CHANGE UP (ref 8.5–10.1)
CALCIUM SERPL-MCNC: 9.2 MG/DL — SIGNIFICANT CHANGE UP (ref 8.5–10.1)
CHLORIDE SERPL-SCNC: 96 MMOL/L — SIGNIFICANT CHANGE UP (ref 96–108)
CHLORIDE SERPL-SCNC: 98 MMOL/L — SIGNIFICANT CHANGE UP (ref 96–108)
CO2 SERPL-SCNC: 24 MMOL/L — SIGNIFICANT CHANGE UP (ref 22–31)
CO2 SERPL-SCNC: 24 MMOL/L — SIGNIFICANT CHANGE UP (ref 22–31)
CREAT ?TM UR-MCNC: 26 MG/DL — SIGNIFICANT CHANGE UP
CREAT SERPL-MCNC: 1 MG/DL — SIGNIFICANT CHANGE UP (ref 0.5–1.3)
CREAT SERPL-MCNC: 1.02 MG/DL — SIGNIFICANT CHANGE UP (ref 0.5–1.3)
GLUCOSE SERPL-MCNC: 100 MG/DL — HIGH (ref 70–99)
GLUCOSE SERPL-MCNC: 111 MG/DL — HIGH (ref 70–99)
HBA1C BLD-MCNC: 5.4 % — SIGNIFICANT CHANGE UP (ref 4–5.6)
HCO3 BLDV-SCNC: 24 MMOL/L — SIGNIFICANT CHANGE UP (ref 21–29)
HCT VFR BLD CALC: 38.9 % — LOW (ref 39–50)
HGB BLD-MCNC: 13.9 G/DL — SIGNIFICANT CHANGE UP (ref 13–17)
MAGNESIUM SERPL-MCNC: 2 MG/DL — SIGNIFICANT CHANGE UP (ref 1.6–2.6)
MCHC RBC-ENTMCNC: 30.6 PG — SIGNIFICANT CHANGE UP (ref 27–34)
MCHC RBC-ENTMCNC: 35.7 GM/DL — SIGNIFICANT CHANGE UP (ref 32–36)
MCV RBC AUTO: 85.7 FL — SIGNIFICANT CHANGE UP (ref 80–100)
OSMOLALITY SERPL: 262 MOSMOL/KG — LOW (ref 275–300)
OSMOLALITY UR: 355 MOSM/KG — SIGNIFICANT CHANGE UP (ref 50–1200)
PCO2 BLDV: 42 MMHG — SIGNIFICANT CHANGE UP (ref 35–50)
PH BLDV: 7.38 — SIGNIFICANT CHANGE UP (ref 7.35–7.45)
PHOSPHATE SERPL-MCNC: 3.5 MG/DL — SIGNIFICANT CHANGE UP (ref 2.5–4.5)
PLATELET # BLD AUTO: 234 K/UL — SIGNIFICANT CHANGE UP (ref 150–400)
PO2 BLDV: 137 MMHG — HIGH (ref 25–45)
POTASSIUM SERPL-MCNC: 3.8 MMOL/L — SIGNIFICANT CHANGE UP (ref 3.5–5.3)
POTASSIUM SERPL-MCNC: 4.3 MMOL/L — SIGNIFICANT CHANGE UP (ref 3.5–5.3)
POTASSIUM SERPL-SCNC: 3.8 MMOL/L — SIGNIFICANT CHANGE UP (ref 3.5–5.3)
POTASSIUM SERPL-SCNC: 4.3 MMOL/L — SIGNIFICANT CHANGE UP (ref 3.5–5.3)
RBC # BLD: 4.54 M/UL — SIGNIFICANT CHANGE UP (ref 4.2–5.8)
RBC # FLD: 11.4 % — SIGNIFICANT CHANGE UP (ref 10.3–14.5)
SAO2 % BLDV: 98 % — HIGH (ref 67–88)
SODIUM SERPL-SCNC: 128 MMOL/L — LOW (ref 135–145)
SODIUM SERPL-SCNC: 130 MMOL/L — LOW (ref 135–145)
SODIUM UR-SCNC: <20 MMOL/L — SIGNIFICANT CHANGE UP
WBC # BLD: 6.36 K/UL — SIGNIFICANT CHANGE UP (ref 3.8–10.5)
WBC # FLD AUTO: 6.36 K/UL — SIGNIFICANT CHANGE UP (ref 3.8–10.5)

## 2019-12-20 RX ORDER — INFLUENZA VIRUS VACCINE 15; 15; 15; 15 UG/.5ML; UG/.5ML; UG/.5ML; UG/.5ML
0.5 SUSPENSION INTRAMUSCULAR ONCE
Refills: 0 | Status: COMPLETED | OUTPATIENT
Start: 2019-12-20 | End: 2019-12-20

## 2019-12-20 RX ORDER — AMLODIPINE BESYLATE 2.5 MG/1
10 TABLET ORAL DAILY
Refills: 0 | Status: DISCONTINUED | OUTPATIENT
Start: 2019-12-21 | End: 2019-12-21

## 2019-12-20 RX ADMIN — Medication 200 MILLIGRAM(S): at 17:19

## 2019-12-20 RX ADMIN — LISINOPRIL 40 MILLIGRAM(S): 2.5 TABLET ORAL at 06:38

## 2019-12-20 RX ADMIN — Medication 200 MILLIGRAM(S): at 06:38

## 2019-12-20 RX ADMIN — Medication 200 MILLIGRAM(S): at 00:40

## 2019-12-20 NOTE — PROGRESS NOTE ADULT - ASSESSMENT
Assessment and Plan:  53 yo Greek-speaking male with a PMH HTN who presents with hyponatremia of 119 on admission.    1) Hyponatremia  - pt w/ urine osmolality > 100, low plasma osmol. Na was improving but now down trending again. concern for SIADH vs polydipsia (drinks 1 gallon of water per day. will place pt on fluid restriction 1200 ml/day. repeat urine osmol in AM. also check urine Na level  - on ACEI with side effect of hypoNa. will stop ACEI and start on amlodipine  - Alcohol use may also be contributing, although patient would be more euvolemic  - No focal neurological deficits on exam  - TSH normal  - No pain/nausea to suggest SIADH  - Checked VBG to evaluate for RTA. no acidosis. no hyperkalemia  - normal A1c level  - Received 1L NS in ED. will hold additional IV fluids for now as it corrected Na too rapidly in 12 hours.   - follow BMP.  - Goal Na correction of no more than 6-8 mEq/24 hours to avoid central pontine myelinolysis  - Seizure precautions    2) HTN  - BPs elevated inpatient (SBPs 150s-170s)  - C/w Labetalol 200 mg BID  - C/w Lisinopril 40 mg QD (pt has developed a slight cough the last few days, does not bother him, but if persistent/worsening, would consider switching Lisinopril to alternative)    3)ETOH use  - encourage abstinence. no s/sx of withdrawal. monitor    4) L eye pterygium  - refer to ophthalmology as OP    Advance directives/GOC/Code status: full  Dispo: med floor  ELOS: 1 more day    All questions/concerns were discussed with patient/family by bedside. used [employer] phone . pt's employer at the bedside. pt gave permission to discuss his medical issues and hospital stay in front of his employer.     Case d/w pt's RN    Total time spent: 35min. More than 50% of time was spent in counseling, discussion of medications, and coordination of care

## 2019-12-20 NOTE — PROGRESS NOTE ADULT - SUBJECTIVE AND OBJECTIVE BOX
CC/reason for follow up: hypoNa    S: no complaints    ROS: no chest pain, no dyspnea    T(C): 36.9 (12-20-19 @ 16:44), Max: 37.1 (12-19-19 @ 23:30)  HR: 65 (12-20-19 @ 16:44) (65 - 80)  BP: 154/76 (12-20-19 @ 16:44) (128/77 - 168/77)  RR: 18 (12-20-19 @ 16:44) (16 - 18)  SpO2: 100% (12-20-19 @ 16:44) (99% - 100%)    Gen - Pleasant, cooperative, in no acute distress  HEENT- PERRL, moist mucus membranes, OP clear, L eye pterygium   CV - RRR, No m/r/g, +pulses, no edema  Lungs - Good effort, Clear to auscultation bilaterally  Abdomen - Soft, nontender, nondistended, +BS, No rebound/rigidity/guarding  Ext - No cyanosis/clubbing.   Skin - No rashes, No jaundice  Psych- Alert & oriented x 3  Neuro- fluent speech, no facial droop, EOMI. strength +5/5 throughout     MEDICATIONS  (STANDING):  labetalol 200 milliGRAM(s) Oral two times a day    MEDICATIONS  (PRN):      Diagnostic studies: personally reviewed  LABS: All Labs Reviewed:                        13.9   6.36  )-----------( 234      ( 20 Dec 2019 06:08 )             38.9     12-20    128<L>  |  96  |  15  ----------------------------<  111<H>  4.3   |  24  |  1.02    Ca    9.2      20 Dec 2019 13:43  Phos  3.5     12-20  Mg     2.0     12-20    TPro  7.9  /  Alb  4.1  /  TBili  1.0  /  DBili  x   /  AST  38<H>  /  ALT  76  /  AlkPhos  120  12-19            Blood Culture:   RADIOLOGY/EKG:    < from: Xray Chest 1 View-PORTABLE IMMEDIATE (12.19.19 @ 17:16) >  Heart suggest normal size.    The lung fields and pleuralsurfaces are unremarkable.    The chest is similar to December 5 of this year.    IMPRESSION: Negative unchanged chest.    < end of copied text >

## 2019-12-21 LAB
ANION GAP SERPL CALC-SCNC: 8 MMOL/L — SIGNIFICANT CHANGE UP (ref 5–17)
BUN SERPL-MCNC: 16 MG/DL — SIGNIFICANT CHANGE UP (ref 7–23)
CALCIUM SERPL-MCNC: 9.3 MG/DL — SIGNIFICANT CHANGE UP (ref 8.5–10.1)
CHLORIDE SERPL-SCNC: 100 MMOL/L — SIGNIFICANT CHANGE UP (ref 96–108)
CO2 SERPL-SCNC: 24 MMOL/L — SIGNIFICANT CHANGE UP (ref 22–31)
CREAT SERPL-MCNC: 0.97 MG/DL — SIGNIFICANT CHANGE UP (ref 0.5–1.3)
GLUCOSE SERPL-MCNC: 92 MG/DL — SIGNIFICANT CHANGE UP (ref 70–99)
OSMOLALITY UR: 331 MOSM/KG — SIGNIFICANT CHANGE UP (ref 50–1200)
POTASSIUM SERPL-MCNC: 3.8 MMOL/L — SIGNIFICANT CHANGE UP (ref 3.5–5.3)
POTASSIUM SERPL-SCNC: 3.8 MMOL/L — SIGNIFICANT CHANGE UP (ref 3.5–5.3)
SODIUM SERPL-SCNC: 132 MMOL/L — LOW (ref 135–145)
SODIUM UR-SCNC: 54 MMOL/L — SIGNIFICANT CHANGE UP

## 2019-12-21 RX ORDER — HYDRALAZINE HCL 50 MG
25 TABLET ORAL EVERY 8 HOURS
Refills: 0 | Status: DISCONTINUED | OUTPATIENT
Start: 2019-12-21 | End: 2019-12-22

## 2019-12-21 RX ADMIN — Medication 200 MILLIGRAM(S): at 05:27

## 2019-12-21 RX ADMIN — Medication 200 MILLIGRAM(S): at 17:37

## 2019-12-21 RX ADMIN — AMLODIPINE BESYLATE 10 MILLIGRAM(S): 2.5 TABLET ORAL at 05:27

## 2019-12-21 RX ADMIN — Medication 25 MILLIGRAM(S): at 22:12

## 2019-12-21 NOTE — PROGRESS NOTE ADULT - SUBJECTIVE AND OBJECTIVE BOX
HOSPITALIST PROGRESS NOTE:  SUBJECTIVE:  PCP:  Chief Complaint: Patient is a 54y old  Male who presents with a chief complaint of hyponatremia (20 Dec 2019 16:52)      HPI:  55 yo Anguillan-speaking male with a PMH HTN who presents with low sodium. He went to see his primary care physician, who told him he had a sodium of 122 and recommended he come to the ED. The patient feels largely well. He was in the hospital from - after having a seizure. At that time, EEG was normal, stress test and TTE were largely normal, and MRI non-contrast notes possible nonspecific periventricular changes. He was determined to have a convulsive syncope vs possible seizure likely due to hypertension (SBP 200s). Since leaving the hospital he has felt slightly "weak" but feels his appetite and weight are normal/stable. He has noted a slight NP cough for the past 3 days but otherwise has not had fevers, chills, CP, SOB, abdominal pain, rash, swelling, N/V, diarrhea, or dysuria.  He says his SBPs have been around 170s but denies headaches. Over the past few years, he has been having gradually generalized blurry vision.  He he states he drinks 3 beers per day, and has not had issues with alcohol withdrawal in the past.  He drinks about a gallon of water per day, urinates about 3 times per day, and has been doing about the same routine since childhood.  In the ED, he was given 1L NS x1. (19 Dec 2019 21:50)    :  Above reviewed;  Patient has no complaints; Na improving but BP still elevated;  used       Allergies:  No Known Allergies    REVIEW OF SYSTEMS:  See HPI. All other review of systems is negative unless indicated above.     OBJECTIVE  Physical Exam:  Vital Signs:    Vital Signs Last 24 Hrs  T(C): 36.3 (21 Dec 2019 12:32), Max: 37 (21 Dec 2019 05:23)  T(F): 97.3 (21 Dec 2019 12:32), Max: 98.6 (21 Dec 2019 05:23)  HR: 70 (21 Dec 2019 12:32) (62 - 70)  BP: 153/82 (21 Dec 2019 12:32) (137/76 - 167/92)  BP(mean): --  RR: 18 (21 Dec 2019 12:32) (18 - 18)  SpO2: 99% (21 Dec 2019 12:32) (99% - 100%)  I&O's Summary    21 Dec 2019 07:01  -  21 Dec 2019 15:53  --------------------------------------------------------  IN: 200 mL / OUT: 0 mL / NET: 200 mL        Constitutional: NAD, awake and alert, well-developed  Neurological: AAO x 3, no focal deficits  HEENT: PERRLA, EOMI, MMM  Neck: Soft and supple, No LAD, No JVD  Respiratory: Breath sounds are clear bilaterally, No wheezing, rales or rhonchi  Cardiovascular: S1 and S2, regular rate and rhythm; no Murmurs, gallops or rubs  Gastrointestinal: Bowel Sounds present, soft, nontender, nondistended, no guarding, no rebound tenderness  Back: No CVA tenderness   Extremities: No peripheral edema  Vascular: 2+ peripheral pulses  Musculoskeletal: 5/5 strength b/l upper and lower extremities  Skin: No rashes  Breast: Deferred  Rectal: Deferred    MEDICATIONS  (STANDING):  amLODIPine   Tablet 10 milliGRAM(s) Oral daily  labetalol 200 milliGRAM(s) Oral two times a day      LABS: All Labs Reviewed:                        13.9   6.36  )-----------( 234      ( 20 Dec 2019 06:08 )             38.9     12-21    132<L>  |  100  |  16  ----------------------------<  92  3.8   |  24  |  0.97    Ca    9.3      21 Dec 2019 07:25  Phos  3.5     12-20  Mg     2.0     12-20    TPro  7.9  /  Alb  4.1  /  TBili  1.0  /  DBili  x   /  AST  38<H>  /  ALT  76  /  AlkPhos  120  12-19        Urinalysis Basic - ( 19 Dec 2019 16:15 )    Color: Yellow / Appearance: Clear / S.010 / pH: x  Gluc: x / Ketone: Negative  / Bili: Negative / Urobili: Negative mg/dL   Blood: x / Protein: Negative mg/dL / Nitrite: Negative   Leuk Esterase: Negative / RBC: x / WBC x   Sq Epi: x / Non Sq Epi: x / Bacteria: x        Blood Culture:       RADIOLOGY/EKG:    < from: Xray Chest 1 View-PORTABLE IMMEDIATE (19 @ 17:16) >    IMPRESSION: Negative unchanged chest.    < end of copied text >

## 2019-12-21 NOTE — PROGRESS NOTE ADULT - ASSESSMENT
53 yo Maltese-speaking male with a PMH HTN who presents with hyponatremia of 119 on admission.    1) Hyponatremia  - pt w/ urine osmolality > 100, low plasma osmol. Na was improving but now down trending again. concern for SIADH vs polydipsia (drinks 1 gallon of water per day. will place pt on fluid restriction 1200 ml/day. repeat urine osmol in AM. also check urine Na level  - on ACEI with side effect of hypoNa. will stop ACEI and start on amlodipine  - Alcohol use may also be contributing, although patient would be more euvolemic  - No focal neurological deficits on exam  - TSH normal  - No pain/nausea to suggest SIADH  - Checked VBG to evaluate for RTA. no acidosis. no hyperkalemia  - normal A1c level  - Received 1L NS in ED. will hold additional IV fluids for now as it corrected Na too rapidly in 12 hours.   - follow BMP.  - Goal Na correction of no more than 6-8 mEq/24 hours to avoid central pontine myelinolysis  - Seizure precautions    2) HTN - uncontrolled   - BPs elevated inpatient (SBPs 150s-170s)  - C/w Labetalol 200 mg BID  - S/P Lisinopril 40 mg QD (pt has developed a slight cough the last few days, does not bother him, but if persistent/worsening, would consider switching Lisinopril to alternative)  - S/P norvasc  - Start hydralazin 25 q8h    3)ETOH use  - encourage abstinence. no s/sx of withdrawal. monitor    4) L eye pterygium  - refer to ophthalmology as OP    Advance directives/GOC/Code status: full

## 2019-12-22 LAB
ANION GAP SERPL CALC-SCNC: 5 MMOL/L — SIGNIFICANT CHANGE UP (ref 5–17)
BUN SERPL-MCNC: 20 MG/DL — SIGNIFICANT CHANGE UP (ref 7–23)
CALCIUM SERPL-MCNC: 9.2 MG/DL — SIGNIFICANT CHANGE UP (ref 8.5–10.1)
CHLORIDE SERPL-SCNC: 101 MMOL/L — SIGNIFICANT CHANGE UP (ref 96–108)
CO2 SERPL-SCNC: 27 MMOL/L — SIGNIFICANT CHANGE UP (ref 22–31)
CREAT SERPL-MCNC: 1.1 MG/DL — SIGNIFICANT CHANGE UP (ref 0.5–1.3)
GLUCOSE SERPL-MCNC: 99 MG/DL — SIGNIFICANT CHANGE UP (ref 70–99)
HCT VFR BLD CALC: 42.6 % — SIGNIFICANT CHANGE UP (ref 39–50)
HGB BLD-MCNC: 14.4 G/DL — SIGNIFICANT CHANGE UP (ref 13–17)
MAGNESIUM SERPL-MCNC: 2.1 MG/DL — SIGNIFICANT CHANGE UP (ref 1.6–2.6)
MCHC RBC-ENTMCNC: 30.1 PG — SIGNIFICANT CHANGE UP (ref 27–34)
MCHC RBC-ENTMCNC: 33.8 GM/DL — SIGNIFICANT CHANGE UP (ref 32–36)
MCV RBC AUTO: 88.9 FL — SIGNIFICANT CHANGE UP (ref 80–100)
PHOSPHATE SERPL-MCNC: 3.3 MG/DL — SIGNIFICANT CHANGE UP (ref 2.5–4.5)
PLATELET # BLD AUTO: 290 K/UL — SIGNIFICANT CHANGE UP (ref 150–400)
POTASSIUM SERPL-MCNC: 4.2 MMOL/L — SIGNIFICANT CHANGE UP (ref 3.5–5.3)
POTASSIUM SERPL-SCNC: 4.2 MMOL/L — SIGNIFICANT CHANGE UP (ref 3.5–5.3)
RBC # BLD: 4.79 M/UL — SIGNIFICANT CHANGE UP (ref 4.2–5.8)
RBC # FLD: 11.8 % — SIGNIFICANT CHANGE UP (ref 10.3–14.5)
SODIUM SERPL-SCNC: 133 MMOL/L — LOW (ref 135–145)
WBC # BLD: 8.37 K/UL — SIGNIFICANT CHANGE UP (ref 3.8–10.5)
WBC # FLD AUTO: 8.37 K/UL — SIGNIFICANT CHANGE UP (ref 3.8–10.5)

## 2019-12-22 RX ORDER — HYDRALAZINE HCL 50 MG
50 TABLET ORAL EVERY 12 HOURS
Refills: 0 | Status: DISCONTINUED | OUTPATIENT
Start: 2019-12-22 | End: 2019-12-22

## 2019-12-22 RX ORDER — HYDRALAZINE HCL 50 MG
50 TABLET ORAL ONCE
Refills: 0 | Status: COMPLETED | OUTPATIENT
Start: 2019-12-22 | End: 2019-12-22

## 2019-12-22 RX ORDER — HYDRALAZINE HCL 50 MG
50 TABLET ORAL THREE TIMES A DAY
Refills: 0 | Status: DISCONTINUED | OUTPATIENT
Start: 2019-12-22 | End: 2019-12-23

## 2019-12-22 RX ADMIN — Medication 200 MILLIGRAM(S): at 05:30

## 2019-12-22 RX ADMIN — Medication 50 MILLIGRAM(S): at 17:10

## 2019-12-22 RX ADMIN — Medication 50 MILLIGRAM(S): at 19:08

## 2019-12-22 RX ADMIN — Medication 200 MILLIGRAM(S): at 17:10

## 2019-12-22 RX ADMIN — Medication 50 MILLIGRAM(S): at 21:16

## 2019-12-22 RX ADMIN — Medication 25 MILLIGRAM(S): at 05:30

## 2019-12-22 NOTE — PROGRESS NOTE ADULT - SUBJECTIVE AND OBJECTIVE BOX
HOSPITALIST PROGRESS NOTE:  SUBJECTIVE:  PCP:  Chief Complaint: Patient is a 54y old  Male who presents with a chief complaint of hyponatremia (20 Dec 2019 16:52)      HPI:  55 yo Danish-speaking male with a PMH HTN who presents with low sodium. He went to see his primary care physician, who told him he had a sodium of 122 and recommended he come to the ED. The patient feels largely well. He was in the hospital from - after having a seizure. At that time, EEG was normal, stress test and TTE were largely normal, and MRI non-contrast notes possible nonspecific periventricular changes. He was determined to have a convulsive syncope vs possible seizure likely due to hypertension (SBP 200s). Since leaving the hospital he has felt slightly "weak" but feels his appetite and weight are normal/stable. He has noted a slight NP cough for the past 3 days but otherwise has not had fevers, chills, CP, SOB, abdominal pain, rash, swelling, N/V, diarrhea, or dysuria.  He says his SBPs have been around 170s but denies headaches. Over the past few years, he has been having gradually generalized blurry vision.  He he states he drinks 3 beers per day, and has not had issues with alcohol withdrawal in the past.  He drinks about a gallon of water per day, urinates about 3 times per day, and has been doing about the same routine since childhood.  In the ED, he was given 1L NS x1. (19 Dec 2019 21:50)    :  Above reviewed;  Patient has no complaints; Na improving but BP still elevated;  used   : Bp still high; Na improving;  used; Patient willing to stay till khushbu;       Allergies:  No Known Allergies    REVIEW OF SYSTEMS:  See HPI. All other review of systems is negative unless indicated above.     OBJECTIVE  Physical Exam:  Vital Signs Last 24 Hrs  T(C): 36.7 (22 Dec 2019 11:58), Max: 37.5 (21 Dec 2019 16:35)  T(F): 98.1 (22 Dec 2019 11:58), Max: 99.5 (21 Dec 2019 16:35)  HR: 69 (22 Dec 2019 11:58) (63 - 72)  BP: 156/78 (22 Dec 2019 11:58) (156/78 - 168/81)  BP(mean): --  RR: 17 (22 Dec 2019 11:58) (17 - 18)  SpO2: 100% (22 Dec 2019 11:58) (100% - 100%)      Constitutional: NAD, awake and alert, well-developed  Neurological: AAO x 3, no focal deficits  HEENT: PERRLA, EOMI, MMM  Neck: Soft and supple, No LAD, No JVD  Respiratory: Breath sounds are clear bilaterally, No wheezing, rales or rhonchi  Cardiovascular: S1 and S2, regular rate and rhythm; no Murmurs, gallops or rubs  Gastrointestinal: Bowel Sounds present, soft, nontender, nondistended, no guarding, no rebound tenderness  Back: No CVA tenderness   Extremities: No peripheral edema  Vascular: 2+ peripheral pulses  Musculoskeletal: 5/5 strength b/l upper and lower extremities  Skin: No rashes  Breast: Deferred  Rectal: Deferred    MEDICATIONS  (STANDING):  amLODIPine   Tablet 10 milliGRAM(s) Oral daily  labetalol 200 milliGRAM(s) Oral two times a day      LABS: All Labs Reviewed:                        13.9   6.36  )-----------( 234      ( 20 Dec 2019 06:08 )             38.9     12-21    132<L>  |  100  |  16  ----------------------------<  92  3.8   |  24  |  0.97    Ca    9.3      21 Dec 2019 07:25  Phos  3.5     12-20  Mg     2.0     12-20    TPro  7.9  /  Alb  4.1  /  TBili  1.0  /  DBili  x   /  AST  38<H>  /  ALT  76  /  AlkPhos  120  12-19        Urinalysis Basic - ( 19 Dec 2019 16:15 )    Color: Yellow / Appearance: Clear / S.010 / pH: x  Gluc: x / Ketone: Negative  / Bili: Negative / Urobili: Negative mg/dL   Blood: x / Protein: Negative mg/dL / Nitrite: Negative   Leuk Esterase: Negative / RBC: x / WBC x   Sq Epi: x / Non Sq Epi: x / Bacteria: x        Blood Culture:       RADIOLOGY/EKG:    < from: Xray Chest 1 View-PORTABLE IMMEDIATE (19 @ 17:16) >    IMPRESSION: Negative unchanged chest.    < end of copied text >

## 2019-12-22 NOTE — PROGRESS NOTE ADULT - ASSESSMENT
55 yo Irish-speaking male with a PMH HTN who presents with hyponatremia of 119 on admission.    1) Hyponatremia  - pt w/ urine osmolality > 100, low plasma osmol. Na was improving but now down trending again. concern for SIADH vs polydipsia (drinks 1 gallon of water per day. will place pt on fluid restriction 1200 ml/day. repeat urine osmol in AM. also check urine Na level  - on ACEI with side effect of hypoNa. will stop ACEI and start hydralazine  - Alcohol use may also be contributing, although patient would be more euvolemic  - No focal neurological deficits on exam  - TSH normal  - No pain/nausea to suggest SIADH  - Checked VBG to evaluate for RTA. no acidosis. no hyperkalemia  - normal A1c level  - Received 1L NS in ED. will hold additional IV fluids for now as it corrected Na too rapidly in 12 hours.   - follow BMP.  - Goal Na correction of no more than 6-8 mEq/24 hours to avoid central pontine myelinolysis  - Seizure precautions    2) HTN - uncontrolled   - BPs elevated inpatient (SBPs 150s-170s)  - C/w Labetalol 200 mg BID  - S/P Lisinopril 40 mg QD (pt has developed a slight cough the last few days, does not bother him, but if persistent/worsening, would consider switching Lisinopril to alternative)  - S/P norvasc  - increase hydralazine 50mg Q12H    3)ETOH use  - encourage abstinence. no s/sx of withdrawal. monitor    4) L eye pterygium  - refer to ophthalmology as OP    Advance directives/GOC/Code status: full

## 2019-12-23 ENCOUNTER — TRANSCRIPTION ENCOUNTER (OUTPATIENT)
Age: 54
End: 2019-12-23

## 2019-12-23 LAB
ANION GAP SERPL CALC-SCNC: 8 MMOL/L — SIGNIFICANT CHANGE UP (ref 5–17)
BUN SERPL-MCNC: 17 MG/DL — SIGNIFICANT CHANGE UP (ref 7–23)
CALCIUM SERPL-MCNC: 9.3 MG/DL — SIGNIFICANT CHANGE UP (ref 8.5–10.1)
CHLORIDE SERPL-SCNC: 98 MMOL/L — SIGNIFICANT CHANGE UP (ref 96–108)
CO2 SERPL-SCNC: 26 MMOL/L — SIGNIFICANT CHANGE UP (ref 22–31)
CREAT SERPL-MCNC: 1.07 MG/DL — SIGNIFICANT CHANGE UP (ref 0.5–1.3)
GLUCOSE SERPL-MCNC: 133 MG/DL — HIGH (ref 70–99)
MAGNESIUM SERPL-MCNC: 2 MG/DL — SIGNIFICANT CHANGE UP (ref 1.6–2.6)
PHOSPHATE SERPL-MCNC: 3 MG/DL — SIGNIFICANT CHANGE UP (ref 2.5–4.5)
POTASSIUM SERPL-MCNC: 4.1 MMOL/L — SIGNIFICANT CHANGE UP (ref 3.5–5.3)
POTASSIUM SERPL-SCNC: 4.1 MMOL/L — SIGNIFICANT CHANGE UP (ref 3.5–5.3)
SODIUM SERPL-SCNC: 132 MMOL/L — LOW (ref 135–145)

## 2019-12-23 RX ORDER — HYDRALAZINE HCL 50 MG
1 TABLET ORAL
Qty: 60 | Refills: 0
Start: 2019-12-23 | End: 2020-01-21

## 2019-12-23 RX ORDER — HYDRALAZINE HCL 50 MG
100 TABLET ORAL
Refills: 0 | Status: DISCONTINUED | OUTPATIENT
Start: 2019-12-24 | End: 2019-12-24

## 2019-12-23 RX ORDER — HYDRALAZINE HCL 50 MG
50 TABLET ORAL ONCE
Refills: 0 | Status: COMPLETED | OUTPATIENT
Start: 2019-12-23 | End: 2019-12-23

## 2019-12-23 RX ADMIN — Medication 50 MILLIGRAM(S): at 05:33

## 2019-12-23 RX ADMIN — Medication 50 MILLIGRAM(S): at 16:04

## 2019-12-23 RX ADMIN — Medication 50 MILLIGRAM(S): at 10:23

## 2019-12-23 RX ADMIN — Medication 0.1 MILLIGRAM(S): at 20:43

## 2019-12-23 RX ADMIN — Medication 200 MILLIGRAM(S): at 17:03

## 2019-12-23 RX ADMIN — Medication 50 MILLIGRAM(S): at 18:50

## 2019-12-23 RX ADMIN — Medication 200 MILLIGRAM(S): at 05:33

## 2019-12-23 NOTE — DISCHARGE NOTE PROVIDER - NSDCMRMEDTOKEN_GEN_ALL_CORE_FT
hydrALAZINE 100 mg oral tablet: 1 tab(s) orally 2 times a day   labetalol 200 mg oral tablet: 1 tab(s) orally 2 times a day cloNIDine 0.1 mg oral tablet: 1 tab(s) orally 2 times a day   hydrALAZINE 100 mg oral tablet: 1 tab(s) orally 2 times a day   labetalol 200 mg oral tablet: 1 tab(s) orally 2 times a day

## 2019-12-23 NOTE — DISCHARGE NOTE PROVIDER - NSDCFUADDAPPT_GEN_ALL_CORE_FT
Figueroa [place] 2:00pm 12/24/19 with Dr Razo./////// [facility] a las [facility]00 p.m.

## 2019-12-23 NOTE — PROGRESS NOTE ADULT - ASSESSMENT
53 yo Chinese-speaking male with a PMH HTN who presents with hyponatremia of 119 on admission.    1) Hyponatremia  - pt w/ urine osmolality > 100, low plasma osmol. Na was improving but now down trending again. concern for SIADH vs polydipsia (drinks 1 gallon of water per day. will place pt on fluid restriction 1200 ml/day. repeat urine osmol in AM. also check urine Na level  - on ACEI with side effect of hypoNa. will stop ACEI and start hydralazine  - Alcohol use may also be contributing, although patient would be more euvolemic  - No focal neurological deficits on exam  - TSH normal  - No pain/nausea to suggest SIADH  - Checked VBG to evaluate for RTA. no acidosis. no hyperkalemia  - normal A1c level  - Received 1L NS in ED. will hold additional IV fluids for now as it corrected Na too rapidly in 12 hours.   - follow BMP.  - Goal Na correction of no more than 6-8 mEq/24 hours to avoid central pontine myelinolysis  - Seizure precautions    2) HTN - uncontrolled   - BPs elevated inpatient (SBPs 150s-170s)  - C/w Labetalol 200 mg BID  - S/P Lisinopril 40 mg QD   - S/P norvasc  - increase hydralazine 100mg Q12H    3)ETOH use  - encourage abstinence. no s/sx of withdrawal. monitor    4) L eye pterygium  - refer to ophthalmology as OP    Advance directives/GOC/Code status: full

## 2019-12-23 NOTE — PROGRESS NOTE ADULT - SUBJECTIVE AND OBJECTIVE BOX
HOSPITALIST PROGRESS NOTE:  SUBJECTIVE:  PCP:  Chief Complaint: Patient is a 54y old  Male who presents with a chief complaint of hyponatremia (20 Dec 2019 16:52)      HPI:  53 yo Nicaraguan-speaking male with a PMH HTN who presents with low sodium. He went to see his primary care physician, who told him he had a sodium of 122 and recommended he come to the ED. The patient feels largely well. He was in the hospital from - after having a seizure. At that time, EEG was normal, stress test and TTE were largely normal, and MRI non-contrast notes possible nonspecific periventricular changes. He was determined to have a convulsive syncope vs possible seizure likely due to hypertension (SBP 200s). Since leaving the hospital he has felt slightly "weak" but feels his appetite and weight are normal/stable. He has noted a slight NP cough for the past 3 days but otherwise has not had fevers, chills, CP, SOB, abdominal pain, rash, swelling, N/V, diarrhea, or dysuria.  He says his SBPs have been around 170s but denies headaches. Over the past few years, he has been having gradually generalized blurry vision.  He he states he drinks 3 beers per day, and has not had issues with alcohol withdrawal in the past.  He drinks about a gallon of water per day, urinates about 3 times per day, and has been doing about the same routine since childhood.  In the ED, he was given 1L NS x1. (19 Dec 2019 21:50)    :  Above reviewed;  Patient has no complaints; Na improving but BP still elevated;  used   : Bp still high; Na improving;  used; Patient willing to stay till khushbu;   : Yesterday BP has been uncontrolled; patients hydralazine was increased to TID;  Patient has no complaints but wants to go home. States that he promise [facility]       Allergies:  No Known Allergies    REVIEW OF SYSTEMS:  See HPI. All other review of systems is negative unless indicated above.     OBJECTIVE  Physical Exam:  Vital Signs Last 24 Hrs  T(C): 36.7 (23 Dec 2019 11:18), Max: 37.3 (22 Dec 2019 16:50)  T(F): 98.1 (23 Dec 2019 11:18), Max: 99.1 (22 Dec 2019 16:50)  HR: 65 (23 Dec 2019 11:18) (64 - 71)  BP: 152/74 (23 Dec 2019 11:18) (152/74 - 186/87)  BP(mean): --  RR: 18 (23 Dec 2019 11:18) (17 - 18)  SpO2: 100% (23 Dec 2019 11:18) (100% - 100%)      Constitutional: NAD, awake and alert, well-developed  Neurological: AAO x 3, no focal deficits  HEENT: PERRLA, EOMI, MMM  Neck: Soft and supple, No LAD, No JVD  Respiratory: Breath sounds are clear bilaterally, No wheezing, rales or rhonchi  Cardiovascular: S1 and S2, regular rate and rhythm; no Murmurs, gallops or rubs  Gastrointestinal: Bowel Sounds present, soft, nontender, nondistended, no guarding, no rebound tenderness  Back: No CVA tenderness   Extremities: No peripheral edema  Vascular: 2+ peripheral pulses  Musculoskeletal: 5/5 strength b/l upper and lower extremities  Skin: No rashes  Breast: Deferred  Rectal: Deferred    MEDICATIONS  (STANDING):  amLODIPine   Tablet 10 milliGRAM(s) Oral daily  labetalol 200 milliGRAM(s) Oral two times a day    Lab Results:  CBC  CBC Full  -  ( 22 Dec 2019 08:13 )  WBC Count : 8.37 K/uL  RBC Count : 4.79 M/uL  Hemoglobin : 14.4 g/dL  Hematocrit : 42.6 %  Platelet Count - Automated : 290 K/uL  Mean Cell Volume : 88.9 fl  Mean Cell Hemoglobin : 30.1 pg  Mean Cell Hemoglobin Concentration : 33.8 gm/dL  Auto Neutrophil # : x  Auto Lymphocyte # : x  Auto Monocyte # : x  Auto Eosinophil # : x  Auto Basophil # : x  Auto Neutrophil % : x  Auto Lymphocyte % : x  Auto Monocyte % : x  Auto Eosinophil % : x  Auto Basophil % : x    .		Differential:	[] Automated		[] Manual  Chemistry                        14.4   8.37  )-----------( 290      ( 22 Dec 2019 08:13 )             42.6     12-    132<L>  |  98  |  17  ----------------------------<  133<H>  4.1   |  26  |  1.07    Ca    9.3      23 Dec 2019 08:38  Phos  3.0     12-  Mg     2.0     12-        Urinalysis Basic - ( 19 Dec 2019 16:15 )    Color: Yellow / Appearance: Clear / S.010 / pH: x  Gluc: x / Ketone: Negative  / Bili: Negative / Urobili: Negative mg/dL   Blood: x / Protein: Negative mg/dL / Nitrite: Negative   Leuk Esterase: Negative / RBC: x / WBC x   Sq Epi: x / Non Sq Epi: x / Bacteria: x    RADIOLOGY/EKG:    < from: Xray Chest 1 View-PORTABLE IMMEDIATE (19 @ 17:16) >    IMPRESSION: Negative unchanged chest.    < end of copied text >

## 2019-12-23 NOTE — DISCHARGE NOTE PROVIDER - CARE PROVIDERS DIRECT ADDRESSES
[email or web address]

## 2019-12-23 NOTE — DISCHARGE NOTE PROVIDER - HOSPITAL COURSE
HOSPITALIST PROGRESS NOTE:    SUBJECTIVE:    PCP:    Chief Complaint: Patient is a 54y old  Male who presents with a chief complaint of hyponatremia (20 Dec 2019 16:52)            HPI:    55 yo Croatian-speaking male with a PMH HTN who presents with low sodium. He went to see his primary care physician, who told him he had a sodium of 122 and recommended he come to the ED. The patient feels largely well. He was in the hospital from 12/5-12/9 after having a seizure. At that time, EEG was normal, stress test and TTE were largely normal, and MRI non-contrast notes possible nonspecific periventricular changes. He was determined to have a convulsive syncope vs possible seizure likely due to hypertension (SBP 200s). Since leaving the hospital he has felt slightly "weak" but feels his appetite and weight are normal/stable. He has noted a slight NP cough for the past 3 days but otherwise has not had fevers, chills, CP, SOB, abdominal pain, rash, swelling, N/V, diarrhea, or dysuria.    He says his SBPs have been around 170s but denies headaches. Over the past few years, he has been having gradually generalized blurry vision.    He he states he drinks 3 beers per day, and has not had issues with alcohol withdrawal in the past.    He drinks about a gallon of water per day, urinates about 3 times per day, and has been doing about the same routine since childhood.    In the ED, he was given 1L NS x1. (19 Dec 2019 21:50)        12/21:  Above reviewed;  Patient has no complaints; Na improving but BP still elevated;  used     12/22: Bp still high; Na improving;  used; Patient willing to stay till khushbu;     12/23: Yesterday BP has been uncontrolled; patients hydralazine was increased to TID;  Patient has no complaints but wants to go home. States that he promise to FU with [facility]         55 yo Croatian-speaking male with a PMH HTN who presents with hyponatremia of 119 on admission.        1) Hyponatremia    - pt w/ urine osmolality > 100, low plasma osmol. Na was improving but now down trending again. concern for SIADH vs polydipsia (drinks 1 gallon of water per day. will place pt on fluid restriction 1200 ml/day. repeat urine osmol in AM. also check urine Na level    - on ACEI with side effect of hypoNa. will stop ACEI and start hydralazine    - Alcohol use may also be contributing, although patient would be more euvolemic    - No focal neurological deficits on exam    - TSH normal    - No pain/nausea to suggest SIADH    - Checked VBG to evaluate for RTA. no acidosis. no hyperkalemia    - normal A1c level    - Received 1L NS in ED. will hold additional IV fluids for now as it corrected Na too rapidly in 12 hours.     - follow BMP.    - Goal Na correction of no more than 6-8 mEq/24 hours to avoid central pontine myelinolysis    - Seizure precautions        2) HTN - uncontrolled     - BPs elevated inpatient (SBPs 150s-170s)    - C/w Labetalol 200 mg BID    - S/P Lisinopril 40 mg QD     - S/P norvasc    - increase hydralazine 100mg Q12H        3)ETOH use    - encourage abstinence. no s/sx of withdrawal. monitor        4) L eye pterygium    - refer to ophthalmology as OP        Advance directives/GOC/Code status: full        total time 65 minutes HOSPITALIST PROGRESS NOTE:    SUBJECTIVE:    PCP:    Chief Complaint: Patient is a 54y old  Male who presents with a chief complaint of hyponatremia (20 Dec 2019 16:52)            HPI:    55 yo Samoan-speaking male with a PMH HTN who presents with low sodium. He went to see his primary care physician, who told him he had a sodium of 122 and recommended he come to the ED. The patient feels largely well. He was in the hospital from 12/5-12/9 after having a seizure. At that time, EEG was normal, stress test and TTE were largely normal, and MRI non-contrast notes possible nonspecific periventricular changes. He was determined to have a convulsive syncope vs possible seizure likely due to hypertension (SBP 200s). Since leaving the hospital he has felt slightly "weak" but feels his appetite and weight are normal/stable. He has noted a slight NP cough for the past 3 days but otherwise has not had fevers, chills, CP, SOB, abdominal pain, rash, swelling, N/V, diarrhea, or dysuria.    He says his SBPs have been around 170s but denies headaches. Over the past few years, he has been having gradually generalized blurry vision.    He he states he drinks 3 beers per day, and has not had issues with alcohol withdrawal in the past.    He drinks about a gallon of water per day, urinates about 3 times per day, and has been doing about the same routine since childhood.    In the ED, he was given 1L NS x1. (19 Dec 2019 21:50)        12/21:  Above reviewed;  Patient has no complaints; Na improving but BP still elevated;  used     12/22: Bp still high; Na improving;  used; Patient willing to stay till khushbu;     12/23: Yesterday BP has been uncontrolled; patients hydralazine was increased to TID;  Patient has no complaints but wants to go home. States that he promise to FU with [facility]     12/24:  Patient didnt leave yesterday because his BP was in 160-180's despite getting hydralazine 100mg Q12H and Labetalol 200 BID; last night started him on clonidine by me; This morning better -150s        55 yo Samoan-speaking male with a PMH HTN who presents with hyponatremia of 119 on admission.        1) Hyponatremia    - pt w/ urine osmolality > 100, low plasma osmol. Na was improving but now down trending again. concern for SIADH vs polydipsia (drinks 1 gallon of water per day. will place pt on fluid restriction 1200 ml/day. repeat urine osmol in AM. also check urine Na level    - on ACEI with side effect of hypoNa. will stop ACEI and start hydralazine    - Alcohol use may also be contributing, although patient would be more euvolemic    - No focal neurological deficits on exam    - TSH normal    - No pain/nausea to suggest SIADH    - Checked VBG to evaluate for RTA. no acidosis. no hyperkalemia    - normal A1c level    - Received 1L NS in ED. will hold additional IV fluids for now as it corrected Na too rapidly in 12 hours.     - follow BMP.    - Goal Na correction of no more than 6-8 mEq/24 hours to avoid central pontine myelinolysis    - Seizure precautions        2) HTN - uncontrolled     - BPs elevated inpatient (SBPs 150s-170s)    - C/w Labetalol 200 mg BID    - S/P Lisinopril 40 mg QD     - S/P norvasc    - increase hydralazine 100mg Q12H    - Add clonidine 0.1mg Q12H    - cardio consult        3)ETOH use    - encourage abstinence. no s/sx of withdrawal. monitor        4) L eye pterygium    - refer to ophthalmology as OP        Advance directives/GOC/Code status: full        total time 65 minutes

## 2019-12-23 NOTE — DISCHARGE NOTE PROVIDER - CARE PROVIDER_API CALL
Jorden Salazar)  Cardiovascular Disease  [address] [place] [address]  Phone: [phone]  Fax: [phone]  Follow Up Time:     Yamilex Razo)  Family Medicine  [address] [place] [address]  Phone: [phone]  Fax: [phone]  Follow Up Time:

## 2019-12-23 NOTE — DISCHARGE NOTE NURSING/CASE MANAGEMENT/SOCIAL WORK - NSDCFUADDAPPT_GEN_ALL_CORE_FT
Figueroa [place] 2:00pm 12/24/19 with Dr Razo./////// [facility] a las 2:00 p.m. [place] 12/26 at 10:20 AM with Dr. Gibson

## 2019-12-23 NOTE — DISCHARGE NOTE NURSING/CASE MANAGEMENT/SOCIAL WORK - PATIENT PORTAL LINK FT
You can access the FollowMyHealth Patient Portal offered by [facility] by registering at the following website: [email or web address]. By joining [facility]’s FollowMyHealth portal, you will also be able to view your health information using other applications (apps) compatible with our system.

## 2019-12-23 NOTE — DISCHARGE NOTE PROVIDER - NSDCCPCAREPLAN_GEN_ALL_CORE_FT
PRINCIPAL DISCHARGE DIAGNOSIS  Diagnosis: Hyponatremia  Assessment and Plan of Treatment: Hyponatremia most likely 2ndry to SIADH  - pt w/ urine osmolality > 100, low plasma osmol. concern for SIADH vs polydipsia (drinks 1 gallon of water per day. will place pt on fluid restriction 1200 ml/day.   - MOnitor NA levels as outpatient   - STOP  ACEI with side effect of hypoNa and start hydralazine  - Alcohol use may also be contributing, Stop Alcohol consumption  - TSH normal  - No pain/nausea to suggest SIADH  - Checked VBG to evaluate for RTA. no acidosis. no hyperkalemia  - normal A1c level        SECONDARY DISCHARGE DIAGNOSES  Diagnosis: Hypertension  Assessment and Plan of Treatment: Uncontrolled   -continue  Labetalol 200 mg BID and  hydralazine 100mg Q12H  -Stop Lisinopril 40 mg QD   -FU with cardio and PCP to check BP PRINCIPAL DISCHARGE DIAGNOSIS  Diagnosis: Hyponatremia  Assessment and Plan of Treatment: Hyponatremia most likely 2ndry to SIADH  - pt w/ urine osmolality > 100, low plasma osmol. concern for SIADH vs polydipsia (drinks 1 gallon of water per day. will place pt on fluid restriction 1200 ml/day.   - MOnitor NA levels as outpatient   - STOP  ACEI with side effect of hypoNa and start hydralazine  - Alcohol use may also be contributing, Stop Alcohol consumption  - TSH normal  - No pain/nausea to suggest SIADH  - Checked VBG to evaluate for RTA. no acidosis. no hyperkalemia  - normal A1c level  -Outpatient nephrology eval        SECONDARY DISCHARGE DIAGNOSES  Diagnosis: Hypertension  Assessment and Plan of Treatment: Uncontrolled   -continue  Labetalol 200 mg BID; Add Clonidine 0.1 BID and  hydralazine 100mg Q12H  -Stop Lisinopril 40 mg QD   -FU with cardio and PCP to monitor BP closely

## 2019-12-24 VITALS — SYSTOLIC BLOOD PRESSURE: 168 MMHG | HEART RATE: 72 BPM | DIASTOLIC BLOOD PRESSURE: 89 MMHG

## 2019-12-24 DIAGNOSIS — I10 ESSENTIAL (PRIMARY) HYPERTENSION: ICD-10-CM

## 2019-12-24 LAB
ANION GAP SERPL CALC-SCNC: 10 MMOL/L — SIGNIFICANT CHANGE UP (ref 5–17)
BUN SERPL-MCNC: 18 MG/DL — SIGNIFICANT CHANGE UP (ref 7–23)
CALCIUM SERPL-MCNC: 9 MG/DL — SIGNIFICANT CHANGE UP (ref 8.5–10.1)
CHLORIDE SERPL-SCNC: 98 MMOL/L — SIGNIFICANT CHANGE UP (ref 96–108)
CO2 SERPL-SCNC: 24 MMOL/L — SIGNIFICANT CHANGE UP (ref 22–31)
CREAT SERPL-MCNC: 1.16 MG/DL — SIGNIFICANT CHANGE UP (ref 0.5–1.3)
GLUCOSE SERPL-MCNC: 136 MG/DL — HIGH (ref 70–99)
POTASSIUM SERPL-MCNC: 4 MMOL/L — SIGNIFICANT CHANGE UP (ref 3.5–5.3)
POTASSIUM SERPL-SCNC: 4 MMOL/L — SIGNIFICANT CHANGE UP (ref 3.5–5.3)
SODIUM SERPL-SCNC: 132 MMOL/L — LOW (ref 135–145)

## 2019-12-24 PROCEDURE — 99222 1ST HOSP IP/OBS MODERATE 55: CPT

## 2019-12-24 RX ORDER — HYDRALAZINE HCL 50 MG
100 TABLET ORAL EVERY 12 HOURS
Refills: 0 | Status: DISCONTINUED | OUTPATIENT
Start: 2019-12-24 | End: 2019-12-24

## 2019-12-24 RX ADMIN — Medication 200 MILLIGRAM(S): at 15:18

## 2019-12-24 RX ADMIN — Medication 100 MILLIGRAM(S): at 05:10

## 2019-12-24 RX ADMIN — Medication 0.1 MILLIGRAM(S): at 15:18

## 2019-12-24 RX ADMIN — Medication 0.1 MILLIGRAM(S): at 05:10

## 2019-12-24 RX ADMIN — Medication 100 MILLIGRAM(S): at 16:13

## 2019-12-24 RX ADMIN — Medication 200 MILLIGRAM(S): at 05:10

## 2019-12-24 NOTE — CONSULT NOTE ADULT - PROBLEM SELECTOR RECOMMENDATION 9
Work for secondary causes negative thus far. Check plasma metanephrines.  recommend uptitration of clonidine to 0.2 mg po BID.  If SBPs in the 160 range may be acceptable for discharge with close outpatient followup.

## 2019-12-24 NOTE — PROGRESS NOTE ADULT - ASSESSMENT
55 yo Chinese-speaking male with a PMH HTN who presents with hyponatremia of 119 on admission.    1) Hyponatremia  - pt w/ urine osmolality > 100, low plasma osmol. Na was improving but now down trending again. concern for SIADH vs polydipsia (drinks 1 gallon of water per day. will place pt on fluid restriction 1200 ml/day. repeat urine osmol in AM. also check urine Na level  - on ACEI with side effect of hypoNa. will stop ACEI and start hydralazine  - Alcohol use may also be contributing, although patient would be more euvolemic  - No focal neurological deficits on exam  - TSH normal  - No pain/nausea to suggest SIADH  - Checked VBG to evaluate for RTA. no acidosis. no hyperkalemia  - normal A1c level  - Received 1L NS in ED. will hold additional IV fluids for now as it corrected Na too rapidly in 12 hours.   - follow BMP.  - Goal Na correction of no more than 6-8 mEq/24 hours to avoid central pontine myelinolysis  - Seizure precautions  - Renal consult     2) HTN - uncontrolled   - BPs elevated inpatient (SBPs 150s-170s)  - C/w Labetalol 200 mg BID  - S/P Lisinopril 40 mg QD   - S/P norvasc  - increase hydralazine 100mg Q12H  - Add clonidine 0.1mg Q12H  - cardio consult     3)ETOH use  - encourage abstinence. no s/sx of withdrawal. monitor    4) L eye pterygium  - refer to ophthalmology as OP    Advance directives/GOC/Code status: full

## 2019-12-24 NOTE — PROGRESS NOTE ADULT - SUBJECTIVE AND OBJECTIVE BOX
HOSPITALIST PROGRESS NOTE:  SUBJECTIVE:  PCP:  Chief Complaint: Patient is a 54y old  Male who presents with a chief complaint of hyponatremia (20 Dec 2019 16:52)      HPI:  55 yo Fijian-speaking male with a PMH HTN who presents with low sodium. He went to see his primary care physician, who told him he had a sodium of 122 and recommended he come to the ED. The patient feels largely well. He was in the hospital from - after having a seizure. At that time, EEG was normal, stress test and TTE were largely normal, and MRI non-contrast notes possible nonspecific periventricular changes. He was determined to have a convulsive syncope vs possible seizure likely due to hypertension (SBP 200s). Since leaving the hospital he has felt slightly "weak" but feels his appetite and weight are normal/stable. He has noted a slight NP cough for the past 3 days but otherwise has not had fevers, chills, CP, SOB, abdominal pain, rash, swelling, N/V, diarrhea, or dysuria.  He says his SBPs have been around 170s but denies headaches. Over the past few years, he has been having gradually generalized blurry vision.  He he states he drinks 3 beers per day, and has not had issues with alcohol withdrawal in the past.  He drinks about a gallon of water per day, urinates about 3 times per day, and has been doing about the same routine since childhood.  In the ED, he was given 1L NS x1. (19 Dec 2019 21:50)    :  Above reviewed;  Patient has no complaints; Na improving but BP still elevated;  used   : Bp still high; Na improving;  used; Patient willing to stay till kh[place];   : Yesterday BP has been uncontrolled; patients hydralazine was increased to TID;  Patient has no complaints but wants to go home. States that he promise to FU with [facility]   :  Patient didnt leave yesterday because his BP was in 160-180's despite getting hydralazine 100mg Q12H and Labetalol 200 BID; last night started him on clonidine by me; This morning better -150s      Allergies:  No Known Allergies    REVIEW OF SYSTEMS:  See HPI. All other review of systems is negative unless indicated above.     OBJECTIVE  Physical Exam:  Vital Signs Last 24 Hrs  T(C): 37.1 (24 Dec 2019 05:24), Max: 37.1 (24 Dec 2019 05:24)  T(F): 98.7 (24 Dec 2019 05:24), Max: 98.7 (24 Dec 2019 05:24)  HR: 74 (24 Dec 2019 07:52) (62 - 74)  BP: 150/73 (24 Dec 2019 07:52) (145/81 - 189/89)  BP(mean): --  RR: 16 (24 Dec 2019 05:24) (16 - 18)  SpO2: 100% (24 Dec 2019 05:24) (100% - 100%)    Constitutional: NAD, awake and alert, well-developed  Neurological: AAO x 3, no focal deficits  HEENT: PERRLA, EOMI, MMM  Neck: Soft and supple, No LAD, No JVD  Respiratory: Breath sounds are clear bilaterally, No wheezing, rales or rhonchi  Cardiovascular: S1 and S2, regular rate and rhythm; no Murmurs, gallops or rubs  Gastrointestinal: Bowel Sounds present, soft, nontender, nondistended, no guarding, no rebound tenderness  Back: No CVA tenderness   Extremities: No peripheral edema  Vascular: 2+ peripheral pulses  Musculoskeletal: 5/5 strength b/l upper and lower extremities  Skin: No rashes  Breast: Deferred  Rectal: Deferred    MEDICATIONS  (STANDING):  amLODIPine   Tablet 10 milliGRAM(s) Oral daily  labetalol 200 milliGRAM(s) Oral two times a day    Lab Results:  CBC    .		Differential:	[] Automated		[] Manual  Chemistry        132<L>  |  98  |  17  ----------------------------<  133<H>  4.1   |  26  |  1.07    Ca    9.3      23 Dec 2019 08:38  Phos  3.0     12-  Mg     2.0     12-      Urinalysis Basic - ( 19 Dec 2019 16:15 )    Color: Yellow / Appearance: Clear / S.010 / pH: x  Gluc: x / Ketone: Negative  / Bili: Negative / Urobili: Negative mg/dL   Blood: x / Protein: Negative mg/dL / Nitrite: Negative   Leuk Esterase: Negative / RBC: x / WBC x   Sq Epi: x / Non Sq Epi: x / Bacteria: x    RADIOLOGY/EKG:    < from: Xray Chest 1 View-PORTABLE IMMEDIATE (19 @ 17:16) >    IMPRESSION: Negative unchanged chest.    < end of copied text >

## 2019-12-24 NOTE — CONSULT NOTE ADULT - ASSESSMENT
55 yo male with hx of PmHX of HTN , and htn crises 55 yo male with hx of PmHX of HTN , and Seizures hx secondary to HTN crises  pt has also hyponatremia - Na improved with NS and fluid restriction       Hyponatremia    in setting of ETOH daily abuse and increased water gallon intake ( potomania/polydipsia?)     limit fluids to 1.2 L iters per day advised   limit EOTH intake    Na stable  - repeat labs with PCP later this week     HTN - secondary HTN workup negative   renin/ado/tsh/ renal doppler negative   UA bland for RBC/protein    check plasma metanephrine for completeness    aim for sbp 140's and then titrate accordingly    advised pt to obtain BP machine at home     advised pt on strict compliance with meds if taking clonidine to avoid rebound HTN risk and not let meds lapse   fup with PCP this week if being dc'd today  -  in future could consider alternative ie ACE or ARB    or clonidine patch if pt can afford     fup with [facility] as above if dc'd home today   d/w 2SW RN

## 2019-12-24 NOTE — CONSULT NOTE ADULT - SUBJECTIVE AND OBJECTIVE BOX
53 y/o w/ h/o HTN admitted for hyponatremia.  treated with 1 L NS & then fluid restriction given h/o polydipsia.  SBPs elevated in 162s on admit.  Tricia to as high as 180s during admit despite uptitration of antiHTN meds.  Remains in hospital due to elevated SBPs.  Currently on labetalol 200 BID, hydralazine 100 q12hrs & clonidine 0.1 mg q12hrs.  Pt denies any symptoms: no chest pain dyspnea, palpitations.  Previously admitted for hypertensive emergency w/ seizure. Workup for secondary causes including TSH, aldosterone levels & renal US were negative.    PAST MEDICAL & SURGICAL HISTORY:  HTN (hypertension)      Allergies  No Known Allergies  Intolerances    SOCIAL HISTORY: ETOHism 3 beers daily, no smoking or drug use.    FAMILY HISTORY:  FH: HTN (hypertension)      MEDICATIONS:  MEDICATIONS  (STANDING):  cloNIDine 0.1 milliGRAM(s) Oral every 12 hours  hydrALAZINE 100 milliGRAM(s) Oral every 12 hours  labetalol 200 milliGRAM(s) Oral two times a day    MEDICATIONS  (PRN):      REVIEW OF SYSTEMS:    CONSTITUTIONAL: No weakness, fevers or chills  EYES/ENT: No visual changes;  No vertigo or throat pain   NECK: No pain or stiffness  RESPIRATORY: No cough, wheezing, hemoptysis; No shortness of breath  CARDIOVASCULAR: No chest pain or palpitations  GASTROINTESTINAL: No abdominal or epigastric pain. No nausea, vomiting, or hematemesis; No diarrhea or constipation. No melena or hematochezia.  GENITOURINARY: No dysuria, frequency or hematuria  NEUROLOGICAL: No numbness or weakness  SKIN: No itching, burning, rashes, or lesions   All other review of systems is negative unless indicated above    Vital Signs Last 24 Hrs  T(C): 37.3 (24 Dec 2019 11:38), Max: 37.3 (24 Dec 2019 11:38)  T(F): 99.1 (24 Dec 2019 11:38), Max: 99.1 (24 Dec 2019 11:38)  HR: 75 (24 Dec 2019 11:38) (62 - 75)  BP: 168/77 (24 Dec 2019 15:06) (134/68 - 183/89)  BP(mean): --  RR: 17 (24 Dec 2019 11:38) (16 - 18)  SpO2: 100% (24 Dec 2019 11:38) (100% - 100%)    I&O's Summary    23 Dec 2019 07:01  -  24 Dec 2019 07:00  --------------------------------------------------------  IN: 90 mL / OUT: 0 mL / NET: 90 mL        PHYSICAL EXAM:    Constitutional: NAD, awake and alert, well-developed  HEENT: PERR, EOMI,  No oral cyananosis.  Neck:  supple,  No JVD  Respiratory: Breath sounds are clear bilaterally, No wheezing, rales or rhonchi  Cardiovascular: S1 and S2, regular rate and rhythm, no Murmurs, gallops or rubs  Gastrointestinal: Bowel Sounds present, soft, nontender.   Extremities: No peripheral edema. No clubbing or cyanosis.  Vascular: 2+ peripheral pulses  Neurological: A/O x 3, no focal deficits  Musculoskeletal: no calf tenderness.  Skin: No rashes.      LABS: All Labs Reviewed:                        14.4   8.37  )-----------( 290      ( 22 Dec 2019 08:13 )             42.6     24 Dec 2019 09:03    132    |  98     |  18     ----------------------------<  136    4.0     |  24     |  1.16   23 Dec 2019 08:38    132    |  98     |  17     ----------------------------<  133    4.1     |  26     |  1.07   22 Dec 2019 08:13    133    |  101    |  20     ----------------------------<  99     4.2     |  27     |  1.10     Ca    9.0        24 Dec 2019 09:03  Ca    9.3        23 Dec 2019 08:38  Ca    9.2        22 Dec 2019 08:13  Phos  3.0       23 Dec 2019 08:38  Phos  3.3       22 Dec 2019 08:13  Mg     2.0       23 Dec 2019 08:38  Mg     2.1       22 Dec 2019 08:13    RADIOLOGY/EKG:    NSR RBBB    < from: Transthoracic Echocardiogram (12.07.19 @ 08:43) >   Summary     Minor Fibrocalcific changes noted to the mitral valve leaflets with   preserved leaflet excursion.   Fibrocalcific changes noted to the Aortic valve leaflets with minimally   restricted leaflet excursion.   Trace aortic regurgitation is present.   Normal appearing tricuspid valve structure and function.   Trace tricuspid valve regurgitation is present.   The left atrium is mildly dilated.   The left ventricle is normal in size, wall thickness, wall motion and   contractility.   Estimated left ventricular ejection fraction is 55 %.   Normal appearing right ventricle structure and function.     Signature     ----------------------------------------------------------------   Electronically signed by Mack Hutchison MD(Interpreting    < end of copied text >    < from: US Abdomen Doppler (12.06.19 @ 08:57) >  IMPRESSION:     No sonographic evidence of renal artery stenosis.      < end of copied text >

## 2019-12-24 NOTE — PROVIDER CONTACT NOTE (OTHER) - SITUATION
Faxed DC papers to PCP
Spoke with Brenda at service regarding Cardio consult.
Spoke with Elsie regarding consult.
Hypertension. Pt. asymptomatic.
Spoke with Corine from MD's office. Will notify PCP patient id admitted to . Please fax discharge provider note and summary to [phone].

## 2019-12-24 NOTE — CONSULT NOTE ADULT - SUBJECTIVE AND OBJECTIVE BOX
55 yo Cuban-speaking male with a PMH HTN who presents with low sodium. He went to see his primary care physician, who told him he had a sodium of 122 and recommended he come to the ED. The patient feels largely well. He was in the hospital from 12/5-12/9 after having a seizure. At that time, EEG was normal, stress test and TTE were largely normal, and MRI non-contrast notes possible nonspecific periventricular changes. He was determined to have a convulsive syncope vs possible seizure likely due to hypertension (SBP 200s). Since leaving the hospital he has felt slightly "weak" but feels his appetite and weight are normal/stable. He has noted a slight NP cough for the past 3 days but otherwise has not had fevers, chills, CP, SOB, abdominal pain, rash, swelling, N/V, diarrhea, or dysuria.  He says his SBPs have been around 170s but denies headaches. Over the past few years, he has been having gradually generalized blurry vision.  He he states he drinks 3 beers per day, and has not had issues with alcohol withdrawal in the past.  He drinks about a gallon of water per day, urinates about 3 times per day, and has been doing about the same routine since childhood.  In the ED, he was given 1L NS x1. (19 Dec 2019 21:50)    12/24:  Patient didnt leave yesterday because his BP was in 160-180's despite getting hydralazine 100mg Q12H and Labetalol 200 BID; last night started him on clonidine by me; This morning better -150  admits to drinking at least a gallon of water per day and at least 3 beer daily     na has improved   overnight - 160's    pt advised on remaining compliant with BP meds and checking BP at home - has appt [facility] this week        Patient is a 54y Male whom presented to the hospital with     PAST MEDICAL & SURGICAL HISTORY:  HTN (hypertension)      MEDICATIONS  (STANDING):  cloNIDine 0.1 milliGRAM(s) Oral every 12 hours  labetalol 200 milliGRAM(s) Oral two times a day      Allergies    No Known Allergies    Intolerances        SOCIAL HISTORY:  Denies ETOh,Smoking,     FAMILY HISTORY:  FH: HTN (hypertension)      REVIEW OF SYSTEMS:    CONSTITUTIONAL: No weakness, fevers or chills  EYES/ENT: No visual changes;  No vertigo or throat pain   NECK: No pain or stiffness  RESPIRATORY: No cough, wheezing, hemoptysis; No shortness of breath  CARDIOVASCULAR: No chest pain or palpitations  GASTROINTESTINAL: No abdominal or epigastric pain. No nausea, vomiting, or hematemesis; No diarrhea or constipation. No melena or hematochezia.  GENITOURINARY: No dysuria, frequency or hematuria  NEUROLOGICAL: No numbness or weakness  SKIN: No itching, burning, rashes, or lesions   All other review of systems is negative unless indicated above.    VITAL:  T(C): , Max: 37.3 (12-24-19 @ 11:38)  T(F): , Max: 99.1 (12-24-19 @ 11:38)  HR: 75 (12-24-19 @ 11:38)  BP: 134/68 (12-24-19 @ 11:38)  BP(mean): --  RR: 17 (12-24-19 @ 11:38)  SpO2: 100% (12-24-19 @ 11:38)  Wt(kg): --    I and O's:    12-23 @ 07:01  -  12-24 @ 07:00  --------------------------------------------------------  IN: 90 mL / OUT: 0 mL / NET: 90 mL          PHYSICAL EXAM:    Constitutional: NAD  HEENT: PERRLA, EOMI,  MMM  Neck: No LAD, No JVD  Respiratory: CTAB  Cardiovascular: S1 and S2  Gastrointestinal: BS+, soft, NT/ND  Extremities: No peripheral edema  Neurological: A/O x 3, no focal deficits  Psychiatric: Normal mood, normal affect  : No Foster  Skin: No rashes  Access: Not applicable    LABS:      132    |  98     |  18     ----------------------------<  136       24 Dec 2019 09:03  4.0     |  24     |  1.16     132    |  98     |  17     ----------------------------<  133       23 Dec 2019 08:38  4.1     |  26     |  1.07     133    |  101    |  20     ----------------------------<  99        22 Dec 2019 08:13  4.2     |  27     |  1.10     Ca    9.0        24 Dec 2019 09:03  Ca    9.3        23 Dec 2019 08:38    Phos  3.0       23 Dec 2019 08:38  Phos  3.3       22 Dec 2019 08:13    Mg     2.0       23 Dec 2019 08:38  Mg     2.1       22 Dec 2019 08:13      Thyroid Stimulating Hormone, Serum: 1.67 uU/mL (12.19.19 @ 16:14)    Osmolality, Random Urine: 331 mosm/Kg (12.20.19 @ 18:00)    Osmolality, Serum: 262 mosmol/kg (12.19.19 @ 22:08)    Renin Activity, Plasma: 0.863:   Aldosterone, Serum: 5.1:      e available.    TECHNIQUE: Color and spectral Doppler evaluation of the kidneys.     FINDINGS:    The kidneys are normal in size, shape, symmetry and echogenicity.  The   right kidney measures 10.6 cm.  The left kidney measures 10.7 cm.  There   are no solid masses, hydronephrosis, calculi, orperinephric collections.    Color and spectral Doppler reveals normal, symmetric blood flow   throughout both kidneys.    Peak aortic velocity is 137 cm/sec.      RIGHT  Renal Artery:   Peak systolic velocity is 106 cm/sec origin, 147 cm/sec proximal, 116   cm/sec mid, 186 cm/sec distal and 125 cm/sec hilum.   Upper Segmental Artery:  RI = 0.74  Middle Segmental Artery: RI = 0.64  Lower Segmental Artery: RI = 0.63     LEFT  Renal Artery:  Peak systolic velocity is 106 cm/sec origin, 156 cm/sec proximal, 138   cm/sec mid, 156 cm/sec distal and 84 cm/sec hilum.   Upper Segmental Artery: RI = 0.62  Middle Segmental Artery: RI = 0.69  Lower Segmental Artery: RI = 0.67    Prostate gland is at the upper limits of normal in size with a volume of   20 9:00 PM.    IMPRESSION:     No sonographic evidence of renal artery stenosis.                Urine Studies:          RADIOLOGY & ADDITIONAL STUDIES:    EXAM:  XR CHEST PORTABLE IMMED 1V                            PROCEDURE DATE:  12/19/2019          INTERPRETATION:  Frontal chest on December 19, 2019 at 5:09 PM. Patient has hyponatremia.    Heart suggest normal size.    The lung fields and pleuralsurfaces are unremarkable.    The chest is similar to December 5 of this year.    IMPRESSION: Negative unchanged chest. 53 yo [place]ian-speaking male with a PMH HTN who presents with low sodium. He went to see his primary care physician, who told him he had a sodium of 122 and recommended he come to the ED. The patient feels largely well. He was in the hospital from 12/5-12/9 after having a seizure. At that time, EEG was normal, stress test and TTE were largely normal, and MRI non-contrast notes possible nonspecific periventricular changes. He was determined to have a convulsive syncope vs possible seizure likely due to hypertension (SBP 200s). Since leaving the hospital he has felt slightly "weak" but feels his appetite and weight are normal/stable. He has noted a slight NP cough for the past 3 days but otherwise has not had fevers, chills, CP, SOB, abdominal pain, rash, swelling, N/V, diarrhea, or dysuria.  He says his SBPs have been around 170s but denies headaches. Over the past few years, he has been having gradually generalized blurry vision.  He he states he drinks 3 beers per day, and has not had issues with alcohol withdrawal in the past.  He drinks about a gallon of water per day, urinates about 3 times per day, and has been doing about the same routine since childhood.  In the ED, he was given 1L NS x1. (19 Dec 2019 21:50)    12/24:  Patient didnt leave yesterday because his BP was in 160-180's despite getting hydralazine 100mg Q12H and Labetalol 200 BID; last night started him on clonidine by me; This morning better -150  admits to drinking at least a gallon of water per day and at least 3 beer daily     na has improved   overnight - 160's    pt advised on remaining compliant with BP meds and checking BP at home - has appt [facility] this week    admits to drinking gallong of water daily and at least 3 beers daily    denies drug use    deies smoking    FHX + HTN, no renal disease      PAST MEDICAL & SURGICAL HISTORY:  HTN (hypertension)      MEDICATIONS  (STANDING):  cloNIDine 0.1 milliGRAM(s) Oral every 12 hours  labetalol 200 milliGRAM(s) Oral two times a day      Allergies    No Known Allergies    Intolerances        SOCIAL HISTORY:  Denies ETOh,Smoking,     FAMILY HISTORY:  FH: HTN (hypertension)      REVIEW OF SYSTEMS:    CONSTITUTIONAL: No weakness, fevers or chills  EYES/ENT: No visual changes;  No vertigo or throat pain   NECK: No pain or stiffness  RESPIRATORY: No cough, wheezing, hemoptysis; No shortness of breath  CARDIOVASCULAR: No chest pain or palpitations  GASTROINTESTINAL: No abdominal or epigastric pain. No nausea, vomiting, or hematemesis; No diarrhea or constipation. No melena or hematochezia.  GENITOURINARY: No dysuria, frequency or hematuria  NEUROLOGICAL: No numbness or weakness  SKIN: No itching, burning, rashes, or lesions   All other review of systems is negative unless indicated above.    VITAL:  Vital Signs Last 24 Hrs  T(C): 37.3 (24 Dec 2019 11:38), Max: 37.3 (24 Dec 2019 11:38)  T(F): 99.1 (24 Dec 2019 11:38), Max: 99.1 (24 Dec 2019 11:38)  HR: 75 (24 Dec 2019 11:38) (62 - 75)  BP: 134/68 (24 Dec 2019 11:38) (134/68 - 189/89)  BP(mean): --  RR: 17 (24 Dec 2019 11:38) (16 - 18)  SpO2: 100% (24 Dec 2019 11:38) (100% - 100%)      I and O's:    12-23 @ 07:01  -  12-24 @ 07:00  --------------------------------------------------------  IN: 90 mL / OUT: 0 mL / NET: 90 mL          PHYSICAL EXAM:    Constitutional: NAD  HEENT: PERRLA, EOMI,  MMM  Neck: No LAD, No JVD  Respiratory: CTAB  Cardiovascular: S1 and S2  Gastrointestinal: BS+, soft, NT/ND  Extremities: No peripheral edema  Neurological: A/O x 3, no focal deficits  Psychiatric: Normal mood, normal affect  : No Foster  Skin: No rashes  Access: Not applicable    LABS:      132    |  98     |  18     ----------------------------<  136       24 Dec 2019 09:03  4.0     |  24     |  1.16     132    |  98     |  17     ----------------------------<  133       23 Dec 2019 08:38  4.1     |  26     |  1.07     133    |  101    |  20     ----------------------------<  99        22 Dec 2019 08:13  4.2     |  27     |  1.10     Ca    9.0        24 Dec 2019 09:03  Ca    9.3        23 Dec 2019 08:38    Phos  3.0       23 Dec 2019 08:38  Phos  3.3       22 Dec 2019 08:13    Mg     2.0       23 Dec 2019 08:38  Mg     2.1       22 Dec 2019 08:13      Thyroid Stimulating Hormone, Serum: 1.67 uU/mL (12.19.19 @ 16:14)    Osmolality, Random Urine: 331 mosm/Kg (12.20.19 @ 18:00)    Osmolality, Serum: 262 mosmol/kg (12.19.19 @ 22:08)    Renin Activity, Plasma: 0.863:   Aldosterone, Serum: 5.1:    Urinalysis (12.19.19 @ 16:15)    Glucose Qualitative, Urine: Negative mg/dL    Blood, Urine: Negative    pH Urine: 7.0    Color: Yellow    Urine Appearance: Clear    Bilirubin: Negative    Ketone - Urine: Negative    Specific Gravity: 1.010    Protein, Urine: Negative mg/dL    Urobilinogen: Negative mg/dL    Nitrite: Negative    Leukocyte Esterase Concentration: Negative      e available.    TECHNIQUE: Color and spectral Doppler evaluation of the kidneys.     FINDINGS:    The kidneys are normal in size, shape, symmetry and echogenicity.  The   right kidney measures 10.6 cm.  The left kidney measures 10.7 cm.  There   are no solid masses, hydronephrosis, calculi, orperinephric collections.    Color and spectral Doppler reveals normal, symmetric blood flow   throughout both kidneys.    Peak aortic velocity is 137 cm/sec.      RIGHT  Renal Artery:   Peak systolic velocity is 106 cm/sec origin, 147 cm/sec proximal, 116   cm/sec mid, 186 cm/sec distal and 125 cm/sec hilum.   Upper Segmental Artery:  RI = 0.74  Middle Segmental Artery: RI = 0.64  Lower Segmental Artery: RI = 0.63     LEFT  Renal Artery:  Peak systolic velocity is 106 cm/sec origin, 156 cm/sec proximal, 138   cm/sec mid, 156 cm/sec distal and 84 cm/sec hilum.   Upper Segmental Artery: RI = 0.62  Middle Segmental Artery: RI = 0.69  Lower Segmental Artery: RI = 0.67    Prostate gland is at the upper limits of normal in size with a volume of   20 9:00 PM.    IMPRESSION:     No sonographic evidence of renal artery stenosis.                Urine Studies:          RADIOLOGY & ADDITIONAL STUDIES:    EXAM:  XR CHEST PORTABLE IMMED 1V                            PROCEDURE DATE:  12/19/2019          INTERPRETATION:  Frontal chest on December 19, 2019 at 5:09 PM. Patient has hyponatremia.    Heart suggest normal size.    The lung fields and pleuralsurfaces are unremarkable.    The chest is similar to December 5 of this year.    IMPRESSION: Negative unchanged chest.

## 2019-12-29 LAB
METANEPHRINE, PL: <10 PG/ML — SIGNIFICANT CHANGE UP (ref 0–62)
NORMETANEPHRINE, PL: <10 PG/ML — SIGNIFICANT CHANGE UP (ref 0–145)

## 2019-12-31 DIAGNOSIS — E22.2 SYNDROME OF INAPPROPRIATE SECRETION OF ANTIDIURETIC HORMONE: ICD-10-CM

## 2019-12-31 DIAGNOSIS — I10 ESSENTIAL (PRIMARY) HYPERTENSION: ICD-10-CM

## 2019-12-31 DIAGNOSIS — H11.002 UNSPECIFIED PTERYGIUM OF LEFT EYE: ICD-10-CM

## 2019-12-31 DIAGNOSIS — Z72.89 OTHER PROBLEMS RELATED TO LIFESTYLE: ICD-10-CM

## 2020-01-06 PROBLEM — I10 ESSENTIAL (PRIMARY) HYPERTENSION: Chronic | Status: ACTIVE | Noted: 2019-12-19

## 2020-02-12 ENCOUNTER — APPOINTMENT (OUTPATIENT)
Dept: CARDIOLOGY | Facility: HOSPITAL | Age: 55
End: 2020-02-12

## 2020-02-18 PROBLEM — Z00.00 ENCOUNTER FOR PREVENTIVE HEALTH EXAMINATION: Status: ACTIVE | Noted: 2020-02-18

## 2020-03-09 ENCOUNTER — APPOINTMENT (OUTPATIENT)
Dept: NEPHROLOGY | Facility: CLINIC | Age: 55
End: 2020-03-09

## 2020-03-09 VITALS
SYSTOLIC BLOOD PRESSURE: 196 MMHG | WEIGHT: 155.42 LBS | HEART RATE: 69 BPM | DIASTOLIC BLOOD PRESSURE: 94 MMHG | OXYGEN SATURATION: 98 %

## 2020-03-09 VITALS — SYSTOLIC BLOOD PRESSURE: 164 MMHG | DIASTOLIC BLOOD PRESSURE: 82 MMHG

## 2020-03-09 DIAGNOSIS — Z82.49 FAMILY HISTORY OF ISCHEMIC HEART DISEASE AND OTHER DISEASES OF THE CIRCULATORY SYSTEM: ICD-10-CM

## 2020-03-09 DIAGNOSIS — Z78.9 OTHER SPECIFIED HEALTH STATUS: ICD-10-CM

## 2020-03-09 DIAGNOSIS — Z87.898 PERSONAL HISTORY OF OTHER SPECIFIED CONDITIONS: ICD-10-CM

## 2020-03-09 RX ORDER — LABETALOL HYDROCHLORIDE 200 MG/1
200 TABLET, FILM COATED ORAL
Qty: 60 | Refills: 0 | Status: ACTIVE | COMMUNITY

## 2020-03-09 RX ORDER — ATORVASTATIN CALCIUM 80 MG/1
TABLET, FILM COATED ORAL
Refills: 0 | Status: ACTIVE | COMMUNITY

## 2020-03-09 NOTE — PHYSICAL EXAM
[General Appearance - Alert] : alert [General Appearance - In No Acute Distress] : in no acute distress [General Appearance - Well Nourished] : well nourished [Sclera] : the sclera and conjunctiva were normal [Outer Ear] : the ears and nose were normal in appearance [Hearing Threshold Finger Rub Not [place]] : hearing was normal [Neck Appearance] : the appearance of the neck was normal [Respiration, Rhythm And Depth] : normal respiratory rhythm and effort [Auscultation Breath Sounds / Voice Sounds] : lungs were clear to auscultation bilaterally [Heart Rate And Rhythm] : heart rate was normal and rhythm regular [Murmurs] : no murmurs [Heart Sounds Pericardial Friction Rub] : no pericardial rub [Edema] : there was no peripheral edema [Abdomen Soft] : soft [Abnormal Walk] : normal gait [Musculoskeletal - Swelling] : no joint swelling seen [] : no rash [Skin Lesions] : no skin lesions [No Focal Deficits] : no focal deficits [Impaired Insight] : insight and judgment were intact [Affect] : the affect was normal [Mood] : the mood was normal

## 2020-03-09 NOTE — HISTORY OF PRESENT ILLNESS
[FreeTextEntry1] : Mr. LESLI SMITH is a 54 year-old man with a PMH of hypertension who [facility] for the management of uncontrolled hypertension and hyponatremia.\par \par Mr Smith was recently admitted to [facility] in 12/5-12/9/2019 for seizure.The seizure was eventually attributed to severe hypertension (BP 200s).  About 1 week after discharge, he was sent to the hospital when outpatient labs showed a serum sodium of 122. He etiology of hyponatremia was thought to be secondary to volume depletion as his serum sodium improved after IV fluid. He was seen by inpatient nephrology team. Since his urine osm was >300, there were concerns that he had SIADH from ACE-inhibitor.  His sodium upon discharge was 133 mEq/L.\par \par HTN: \par Newly diagnosed in 12/2019 during hospitalization. Patient had not seen a doctor in the past.\par Inpatient workup:\par Plasma metanephrine level was not elevated\par Aldosterone level was not elevated\par US renal doppler showed no evidence of renal artery stenosis.\par TSH was not elevated\par \par Since discharge, he has been feeling very well. He takes his medicine daily (labetalol 200mg BID). He checks his BP at home twice a day and noted that his SBPs were 130s-140s in the morning and 150s-160s in the afternoon. He does not recall the diastolic BP readings. Currently, he has a good appetite. He denies hematuria, frothy urine or dysuria. He denies shortness of breath, chest pain, headache, edema. No hand tremors. He denies NSAID use or herbal supplements. \par

## 2020-03-09 NOTE — CONSULT LETTER
[Dear  ___] : Dear  [unfilled], [Consult Letter:] : I had the pleasure of evaluating your patient, [unfilled]. [Please see my note below.] : Please see my note below. [Consult Closing:] : Thank you very much for allowing me to participate in the care of this patient.  If you have any questions, please do not hesitate to contact me. [Sincerely,] : Sincerely, [FreeTextEntry3] : Pao Bailey MD\[facility] Nephrology

## 2020-03-09 NOTE — ASSESSMENT
[FreeTextEntry1] : 1. Hyponatremia (hypotonic). The etiology of hyponatremia in the hospital was initially thought to be from volume depletion and secondary to SIADH. He did not appear to have a particular trigger for ADH release, and thus the inpatient nephrologist had attributed the SIADH to ACE-inhibitor use. I will repeat BMP, serum osm, urine osm and urine sodium today.\par \par 2. HTN - Goal BP for patient is < 130/80. Patient's current BP is 162/84. Home SBP ranges from 130s-160s. He is to continue labetalol 200mg BID. I will start amlodipine 5mg daily. He is to continue checking BP twice a day and to call me in 1 week to give me BP readings. \par \par Patient is recommended to follow up in 4-5 months. \par \par

## 2020-03-10 LAB
ANION GAP SERPL CALC-SCNC: 12 MMOL/L
BUN SERPL-MCNC: 11 MG/DL
CALCIUM SERPL-MCNC: 10.2 MG/DL
CHLORIDE SERPL-SCNC: 93 MMOL/L
CO2 SERPL-SCNC: 27 MMOL/L
CREAT SERPL-MCNC: 1.06 MG/DL
GLUCOSE SERPL-MCNC: 105 MG/DL
OSMOLALITY SERPL: 281 MOSMOL/KG
OSMOLALITY UR: 643 MOSM/KG
POTASSIUM SERPL-SCNC: 5 MMOL/L
SODIUM ?TM SUB UR QN: 107 MMOL/L
SODIUM SERPL-SCNC: 132 MMOL/L

## 2020-07-01 NOTE — PROGRESS NOTE ADULT - PROBLEM/PLAN-1
[History reviewed] : History reviewed. [Medications and Allergies reviewed] : Medications and allergies reviewed. DISPLAY PLAN FREE TEXT

## 2020-07-27 ENCOUNTER — APPOINTMENT (OUTPATIENT)
Dept: NEPHROLOGY | Facility: CLINIC | Age: 55
End: 2020-07-27
Payer: SELF-PAY

## 2020-07-27 VITALS
HEIGHT: 62 IN | WEIGHT: 154 LBS | BODY MASS INDEX: 28.34 KG/M2 | DIASTOLIC BLOOD PRESSURE: 91 MMHG | HEART RATE: 71 BPM | SYSTOLIC BLOOD PRESSURE: 186 MMHG | OXYGEN SATURATION: 98 %

## 2020-07-27 VITALS — SYSTOLIC BLOOD PRESSURE: 164 MMHG | DIASTOLIC BLOOD PRESSURE: 87 MMHG

## 2020-07-27 PROCEDURE — 99214 OFFICE O/P EST MOD 30 MIN: CPT

## 2020-07-27 RX ORDER — AMLODIPINE BESYLATE 5 MG/1
5 TABLET ORAL TWICE DAILY
Qty: 180 | Refills: 3 | Status: ACTIVE | COMMUNITY
Start: 2020-03-09 | End: 1900-01-01

## 2020-07-27 NOTE — ASSESSMENT
[FreeTextEntry1] : 1. Hyponatremia (hypotonic). The etiology of hyponatremia in the hospital was initially thought to be from volume depletion and secondary to SIADH. The repeat BMP in March 2020 showed isotonic hyponatremia. I will repeat BMP, serum osm, urine osm and urine sodium today. \par \par 2. HTN - Goal BP for patient is < 130/80. Patient's current BP is 167/84. Home SBP ranges from 130s-140s/70-84. He likely has white coat hypertension on top of essential hypertension. He is to continue labetalol 200mg BID and hydralazine 25mg BID. I will increase amlodipine from 5mg daily to 10mg daily. He is to continue checking BP twice a day and to call me in 1 week to give me BP readings. \par \par Patient is recommended to follow up in 6 months. \par \par

## 2020-07-27 NOTE — CONSULT LETTER
[Dear  ___] : Dear  [unfilled], [Consult Letter:] : I had the pleasure of evaluating your patient, [unfilled]. [Sincerely,] : Sincerely, [Please see my note below.] : Please see my note below. [Consult Closing:] : Thank you very much for allowing me to participate in the care of this patient.  If you have any questions, please do not hesitate to contact me. [FreeTextEntry3] : Pao Bailey MD\[facility]

## 2020-07-27 NOTE — PHYSICAL EXAM
[General Appearance - In No Acute Distress] : in no acute distress [General Appearance - Alert] : alert [Sclera] : the sclera and conjunctiva were normal [General Appearance - Well Nourished] : well nourished [Neck Appearance] : the appearance of the neck was normal [Outer Ear] : the ears and nose were normal in appearance [Hearing Threshold Finger Rub Not [place]] : hearing was normal [Respiration, Rhythm And Depth] : normal respiratory rhythm and effort [Auscultation Breath Sounds / Voice Sounds] : lungs were clear to auscultation bilaterally [Heart Rate And Rhythm] : heart rate was normal and rhythm regular [Murmurs] : no murmurs [Heart Sounds Pericardial Friction Rub] : no pericardial rub [Edema] : there was no peripheral edema [Abdomen Soft] : soft [Abnormal Walk] : normal gait [Skin Lesions] : no skin lesions [Musculoskeletal - Swelling] : no joint swelling seen [] : no rash [No Focal Deficits] : no focal deficits [Impaired Insight] : insight and judgment were intact [Affect] : the affect was normal [Mood] : the mood was normal

## 2020-07-27 NOTE — HISTORY OF PRESENT ILLNESS
[FreeTextEntry1] : Mr. LESLI SMITH is a 54 year-old man with a PMH of hypertension who presents [facility] for the management of uncontrolled hypertension and hyponatremia.\par \par Mr Smith was admitted to [facility] in 12/5-12/9/2019 for seizure.The seizure was eventually attributed to severe hypertension (BP 200s).  About 1 week after discharge, he was sent to the hospital when outpatient labs showed a serum sodium of 122. He etiology of hyponatremia was thought to be secondary to volume depletion as his serum sodium improved after IV fluid. He was seen by inpatient nephrology team. Since his urine osm was >300, there were concerns that he had SIADH from ACE-inhibitor.  His sodium upon discharge was 133 mEq/L.\par \par HTN: \par Newly diagnosed in 12/2019 during hospitalization. Patient had not seen a doctor in the past.\par Inpatient workup:\par Plasma metanephrine level was not elevated\par Aldosterone level was not elevated\par US renal doppler showed no evidence of renal artery stenosis.\par TSH was not elevated\par \par Interval history:\par Since the last visit in March 2020, he has been feeling well. He checks his blood pressure daily using an arm cuff (128-138/80-84). He denies headache/ lightheadedness/ leg swelling/ chest pain/ shortness of breath/cough. He denies urinary symptoms.  Currently he is taking amlodipine 5mg daily, hydralazine 25mg BID and labetalol 200mg BID. \par \par In regards to hyponatremia - he said that he no longer tries to drink too much water. He drinks fluid by thirst. He denies nausea/vomiting/diarrhea/pain. He has a good balance. \par Since discharge, he has been feeling very well. He takes his medicine daily (labetalol 200mg BID). He checks his BP at home twice a day and noted that his SBPs were 130s-140s in the morning and 150s-160s in the afternoon. He does not recall the diastolic BP readings. Currently, he has a good appetite. He denies hematuria, frothy urine or dysuria. He denies shortness of breath, chest pain, headache, edema. No hand tremors. He denies NSAID use or herbal supplements. \par 
coffee

## 2020-07-28 LAB
ALBUMIN SERPL ELPH-MCNC: 5.1 G/DL
ALP BLD-CCNC: 94 U/L
ALT SERPL-CCNC: 46 U/L
ANION GAP SERPL CALC-SCNC: 13 MMOL/L
APPEARANCE: CLEAR
AST SERPL-CCNC: 42 U/L
BACTERIA: NEGATIVE
BILIRUB SERPL-MCNC: 1.4 MG/DL
BILIRUBIN URINE: NEGATIVE
BLOOD URINE: NEGATIVE
BUN SERPL-MCNC: 20 MG/DL
CALCIUM SERPL-MCNC: 10.1 MG/DL
CHLORIDE SERPL-SCNC: 97 MMOL/L
CO2 SERPL-SCNC: 25 MMOL/L
COLOR: NORMAL
CREAT SERPL-MCNC: 0.98 MG/DL
CREAT SPEC-SCNC: 83 MG/DL
CREAT/PROT UR: 0.1 RATIO
GLUCOSE QUALITATIVE U: NEGATIVE
GLUCOSE SERPL-MCNC: 116 MG/DL
HYALINE CASTS: 0 /LPF
KETONES URINE: NEGATIVE
LEUKOCYTE ESTERASE URINE: NEGATIVE
MICROSCOPIC-UA: NORMAL
NITRITE URINE: NEGATIVE
OSMOLALITY SERPL: 290 MOSMOL/KG
PH URINE: 7.5
POTASSIUM SERPL-SCNC: 4.7 MMOL/L
PROT SERPL-MCNC: 7.7 G/DL
PROT UR-MCNC: 10 MG/DL
PROTEIN URINE: NORMAL
RED BLOOD CELLS URINE: 1 /HPF
SODIUM ?TM SUB UR QN: 128 MMOL/L
SODIUM SERPL-SCNC: 135 MMOL/L
SPECIFIC GRAVITY URINE: 1.02
SQUAMOUS EPITHELIAL CELLS: 0 /HPF
UROBILINOGEN URINE: NORMAL
WHITE BLOOD CELLS URINE: 0 /HPF

## 2020-07-30 LAB — OSMOLALITY 24H UR: 688 MOSM/KG

## 2020-11-05 ENCOUNTER — APPOINTMENT (OUTPATIENT)
Dept: COLORECTAL SURGERY | Facility: CLINIC | Age: 55
End: 2020-11-05

## 2021-01-25 ENCOUNTER — OUTPATIENT (OUTPATIENT)
Dept: OUTPATIENT SERVICES | Facility: HOSPITAL | Age: 56
LOS: 1 days | End: 2021-01-25
Payer: SELF-PAY

## 2021-01-25 ENCOUNTER — APPOINTMENT (OUTPATIENT)
Dept: NEPHROLOGY | Facility: CLINIC | Age: 56
End: 2021-01-25

## 2021-01-25 VITALS
SYSTOLIC BLOOD PRESSURE: 186 MMHG | WEIGHT: 158 LBS | DIASTOLIC BLOOD PRESSURE: 91 MMHG | OXYGEN SATURATION: 100 % | HEART RATE: 71 BPM | BODY MASS INDEX: 28.9 KG/M2 | TEMPERATURE: 97.88 F

## 2021-01-25 VITALS — SYSTOLIC BLOOD PRESSURE: 160 MMHG | DIASTOLIC BLOOD PRESSURE: 90 MMHG

## 2021-01-25 DIAGNOSIS — I10 ESSENTIAL (PRIMARY) HYPERTENSION: ICD-10-CM

## 2021-01-25 DIAGNOSIS — E87.1 HYPO-OSMOLALITY AND HYPONATREMIA: ICD-10-CM

## 2021-01-25 PROCEDURE — G0463: CPT

## 2021-01-25 NOTE — PHYSICAL EXAM
[General Appearance - Alert] : alert [General Appearance - In No Acute Distress] : in no acute distress [General Appearance - Well Nourished] : well nourished [Sclera] : the sclera and conjunctiva were normal [Outer Ear] : the ears and nose were normal in appearance [Hearing Threshold Finger Rub Not [place]] : hearing was normal [Neck Appearance] : the appearance of the neck was normal [Respiration, Rhythm And Depth] : normal respiratory rhythm and effort [Auscultation Breath Sounds / Voice Sounds] : lungs were clear to auscultation bilaterally [Heart Rate And Rhythm] : heart rate was normal and rhythm regular [Murmurs] : no murmurs [Heart Sounds Pericardial Friction Rub] : no pericardial rub [Edema] : there was no peripheral edema [Abdomen Soft] : soft [Abnormal Walk] : normal gait [Musculoskeletal - Swelling] : no joint swelling seen [] : no rash [Skin Lesions] : no skin lesions [No Focal Deficits] : no focal deficits [Impaired Insight] : insight and judgment were intact [Affect] : the affect was normal [Mood] : the mood was normal

## 2021-01-25 NOTE — HISTORY OF PRESENT ILLNESS
[FreeTextEntry1] : Mr. LESLI SMITH is a 55 year-old man with a PMH of hypertension who presents [facility] for the management of hypertension and hyponatremia.\par \par Mr Smith was admitted to [facility] in 12/5-12/9/2019 for seizure.The seizure was eventually attributed to severe hypertension (BP 200s).  About 1 week after discharge, he was sent to the hospital when outpatient labs showed a serum sodium of 122. He etiology of hyponatremia was thought to be secondary to volume depletion as his serum sodium improved after IV fluid. He was seen by inpatient nephrology team. Since his urine osm was >300, there were concerns that he had SIADH from ACE-inhibitor.  His sodium upon discharge was 133 mEq/L.\par \par HTN: \par Newly diagnosed in 12/2019 during hospitalization. Patient had not seen a doctor in the past.\par Inpatient workup:\par Plasma metanephrine level was not elevated\par Aldosterone level was not elevated\par US renal doppler showed no evidence of renal artery stenosis.\par TSH was not elevated\par \par Interval history:\par Since July 2020, he has been feeling well. He checks his blood pressure daily using an arm cuff (130-136/70-84). He denies headache/ lightheadedness/ leg swelling/ chest pain/ shortness of breath/cough. He denies urinary symptoms.  Currently he is taking amlodipine 5mg daily, hydralazine 25mg BID and labetalol 200mg BID. \par He just had his blood drawn at Dr. Razo's office 1 month ago. \par

## 2021-01-25 NOTE — ASSESSMENT
[FreeTextEntry1] : 1. Hyponatremia (hypotonic). The etiology of hyponatremia in the hospital was initially thought to be from volume depletion and secondary to SIADH. His hyponatremia resolved. \par \par 2. HTN - Goal BP for patient is < 130/80. Patient's current BP is 167/84. Home SBP ranges from 130s-140s/70-84. He likely has white coat hypertension on top of essential hypertension. He is to continue labetalol 200mg BID and hydralazine 25mg BID and amlodipine 5mg daily. He is to continue taking his medications. He did not bring his medication list today. I will call Mary tomorrow to confirm his BP medications. \par \par He does not need long term follow up with me. However, he can schedule appointment any time if a new issue arises (hyponatremia/ uncontrolled hypertension/ worsening kidney function).

## 2021-01-27 DIAGNOSIS — I10 ESSENTIAL (PRIMARY) HYPERTENSION: ICD-10-CM

## 2021-01-27 DIAGNOSIS — E87.1 HYPO-OSMOLALITY AND HYPONATREMIA: ICD-10-CM

## 2021-02-10 ENCOUNTER — NON-APPOINTMENT (OUTPATIENT)
Age: 56
End: 2021-02-10

## 2025-01-03 NOTE — ED ADULT NURSE NOTE - PRO INTERPRETER NEED 2
You were seen today in the emergency department for your back pain.  We have evaluated you and determined that you likely have a muscle strain.  We now feel you are safe for discharge home.    Please return to the emergency department immediately if develop any new or worsening concerns including chest pain, shortness of breath, abdominal pain, nausea, vomiting, diarrhea, weakness, loss consciousness, fever, chills, or any other concerns.    Please call your PCP and schedule appointment within the next 24 to 48 hours for follow-up.    
[place]

## 2025-04-28 ENCOUNTER — TRANSCRIPTION ENCOUNTER (OUTPATIENT)
Age: 60
End: 2025-04-28

## 2025-05-08 ENCOUNTER — TRANSCRIPTION ENCOUNTER (OUTPATIENT)
Age: 60
End: 2025-05-08